# Patient Record
Sex: FEMALE | Race: BLACK OR AFRICAN AMERICAN | NOT HISPANIC OR LATINO | Employment: OTHER | URBAN - METROPOLITAN AREA
[De-identification: names, ages, dates, MRNs, and addresses within clinical notes are randomized per-mention and may not be internally consistent; named-entity substitution may affect disease eponyms.]

---

## 2017-01-11 ENCOUNTER — LAB VISIT (OUTPATIENT)
Dept: LAB | Facility: HOSPITAL | Age: 82
End: 2017-01-11
Attending: FAMILY MEDICINE
Payer: MEDICARE

## 2017-01-11 DIAGNOSIS — E03.4 HYPOTHYROIDISM DUE TO ACQUIRED ATROPHY OF THYROID: ICD-10-CM

## 2017-01-11 LAB — TSH SERPL DL<=0.005 MIU/L-ACNC: 0.52 UIU/ML

## 2017-01-11 PROCEDURE — 84443 ASSAY THYROID STIM HORMONE: CPT

## 2017-01-11 PROCEDURE — 36415 COLL VENOUS BLD VENIPUNCTURE: CPT | Mod: PO

## 2017-04-12 ENCOUNTER — OFFICE VISIT (OUTPATIENT)
Dept: URGENT CARE | Facility: CLINIC | Age: 82
End: 2017-04-12
Payer: MEDICARE

## 2017-04-12 ENCOUNTER — PATIENT MESSAGE (OUTPATIENT)
Dept: INTERNAL MEDICINE | Facility: CLINIC | Age: 82
End: 2017-04-12

## 2017-04-12 ENCOUNTER — TELEPHONE (OUTPATIENT)
Dept: PODIATRY | Facility: CLINIC | Age: 82
End: 2017-04-12

## 2017-04-12 ENCOUNTER — OFFICE VISIT (OUTPATIENT)
Dept: PODIATRY | Facility: CLINIC | Age: 82
End: 2017-04-12
Payer: MEDICARE

## 2017-04-12 VITALS
DIASTOLIC BLOOD PRESSURE: 82 MMHG | BODY MASS INDEX: 22.32 KG/M2 | HEIGHT: 64 IN | TEMPERATURE: 99 F | HEART RATE: 96 BPM | WEIGHT: 130.75 LBS | OXYGEN SATURATION: 97 % | SYSTOLIC BLOOD PRESSURE: 123 MMHG

## 2017-04-12 VITALS
WEIGHT: 131.63 LBS | BODY MASS INDEX: 22.47 KG/M2 | HEART RATE: 43 BPM | HEIGHT: 64 IN | SYSTOLIC BLOOD PRESSURE: 142 MMHG | DIASTOLIC BLOOD PRESSURE: 71 MMHG

## 2017-04-12 DIAGNOSIS — R00.1 BRADYCARDIA: Primary | ICD-10-CM

## 2017-04-12 DIAGNOSIS — I44.0 AV BLOCK, 1ST DEGREE: ICD-10-CM

## 2017-04-12 DIAGNOSIS — B35.1 ONYCHOMYCOSIS DUE TO DERMATOPHYTE: Primary | ICD-10-CM

## 2017-04-12 DIAGNOSIS — I73.9 PERIPHERAL VASCULAR DISEASE: ICD-10-CM

## 2017-04-12 PROCEDURE — 99213 OFFICE O/P EST LOW 20 MIN: CPT | Mod: PBBFAC,25,27,PO | Performed by: PODIATRIST

## 2017-04-12 PROCEDURE — 99213 OFFICE O/P EST LOW 20 MIN: CPT | Mod: 25,S$PBB,, | Performed by: PODIATRIST

## 2017-04-12 PROCEDURE — 99213 OFFICE O/P EST LOW 20 MIN: CPT | Mod: PBBFAC,PO | Performed by: NURSE PRACTITIONER

## 2017-04-12 PROCEDURE — 99999 PR PBB SHADOW E&M-EST. PATIENT-LVL III: CPT | Mod: PBBFAC,,, | Performed by: PODIATRIST

## 2017-04-12 PROCEDURE — 99999 PR PBB SHADOW E&M-EST. PATIENT-LVL III: CPT | Mod: PBBFAC,,, | Performed by: NURSE PRACTITIONER

## 2017-04-12 PROCEDURE — 93010 ELECTROCARDIOGRAM REPORT: CPT | Mod: ,,, | Performed by: INTERNAL MEDICINE

## 2017-04-12 PROCEDURE — 99214 OFFICE O/P EST MOD 30 MIN: CPT | Mod: S$PBB,,, | Performed by: NURSE PRACTITIONER

## 2017-04-12 PROCEDURE — 11721 DEBRIDE NAIL 6 OR MORE: CPT | Mod: Q8,S$PBB,, | Performed by: PODIATRIST

## 2017-04-12 PROCEDURE — 93005 ELECTROCARDIOGRAM TRACING: CPT | Mod: PBBFAC,PO | Performed by: NURSE PRACTITIONER

## 2017-04-12 PROCEDURE — 11721 DEBRIDE NAIL 6 OR MORE: CPT | Mod: Q8,PBBFAC,PO | Performed by: PODIATRIST

## 2017-04-12 NOTE — TELEPHONE ENCOUNTER
Returned 's call regarding her wanting to know what she should do about her pulse from today's visit. I informed  that she should contact her PCP () and inform him of her pulse.  stated understanding and the call ended well.

## 2017-04-12 NOTE — TELEPHONE ENCOUNTER
----- Message from Kate Severino sent at 4/12/2017  3:23 PM CDT -----  States would like to speak to nurse regarding medication. Please adv/call [phone].//thanks. cw

## 2017-04-12 NOTE — MR AVS SNAPSHOT
[facility]  [address] [facility] [address] [place] [address]  Phone: [phone]  Fax: [phone]                  Becki Luna   2017 5:50 PM   Office Visit    Description:  Female : 10/23/1921   Provider:  Tierney Hernandez NP   Department:  [facility]           Reason for Visit     Follow-up     pulse check           Diagnoses this Visit        Comments    Bradycardia    -  Primary Follow up with Cardiology for abnormal EKG.    AV block, 1st degree                To Do List           Future Appointments        Provider Department Dept Phone    2017 3:20 PM Manohar Culp MD [facility] - Cardiology [phone]    2017 8:00 AM Jun Farfan MD [facility] Internal Medicine [phone]    2017 10:20 AM Cas De La Fuente DPM [facility] Podiatry [phone]    2017 1:30 PM Arturo Esquivel MD [facility] Ophthalmology [phone]      Goals (5 Years of Data)     None      Follow-Up and Disposition     Return if symptoms worsen or fail to improve.      [facility]     [facility] On Call Nurse Care Line -  Assistance  Unless otherwise directed by your provider, please contact [facility], our nurse care line that is available for  assistance.     Registered nurses in the [facility] provide: appointment scheduling, clinical advisement, health education, and other advisory services.  Call: [phone] (toll free)               Medications           Message regarding Medications     Verify the changes and/or additions to your medication regime listed below are the same as discussed with your clinician today.  If any of these changes or additions are incorrect, please notify your healthcare provider.             Verify that the below list of medications is an accurate representation of the medications you are currently taking.  If none reported, the list may be blank. If incorrect, please contact your healthcare provider. Carry this list with you in case of  "emergency.           Current Medications     albuterol (PROVENTIL HFA/VENTOLIN HFA) 200 puff inhaler Inhale 2 puffs into the lungs 4 times daily as needed. Prn cough/wheeze    amlodipine (NORVASC) 5 MG tablet Take 1 tablet (5 mg total) by mouth once daily.    ASPIRIN ORAL Take by mouth.    fluvastatin XL (LESCOL XL) 80 mg Tb24 Take 1 tablet (80 mg total) by mouth once daily.    levothyroxine (SYNTHROID) 137 MCG Tab tablet Take 1 tablet (137 mcg total) by mouth once daily.    losartan-hydrochlorothiazide 100-25 mg (HYZAAR) 100-25 mg per tablet take 1 tablet by mouth once daily    PROPYLENE GLYCOL/ (SYSTANE ULTRA OPHT) Apply 1 drop to eye daily as needed.    TETRAHYDROZOLINE HCL (VISINE OPHT) Apply to eye as needed.    tobramycin (TOBREX) 0.3 % ophthalmic solution Place 1 drop into the right eye 4 (four) times daily.    colchicine (COLCRYS) 0.6 mg tablet Take 1 tablet (0.6 mg total) by mouth once daily. One pill daily    gabapentin (NEURONTIN) 100 MG capsule Take 1 capsule (100 mg total) by mouth 3 (three) times daily.           Clinical Reference Information           Your Vitals Were     BP Pulse Temp Height    123/82 (BP Location: Right arm, Patient Position: Sitting, BP Method: Manual) 96 98.6 °F (37 °C) (Tympanic) 5' 4" (1.626 m)    Weight SpO2 BMI    59.3 kg (130 lb 11.7 oz) 97% 22.44 kg/m2      Blood Pressure          Most Recent Value    BP  123/82      Allergies as of 4/12/2017     Celecoxib    Naproxen      Immunizations Administered on Date of Encounter - 4/12/2017     None      Orders Placed During Today's Visit      Normal Orders This Visit    Ambulatory referral to Cardiology     IN OFFICE EKG 12-LEAD (to [place])       Instructions      Understanding First-Degree Heart Block    Heart block is a condition in which the electrical system of the heart does not work properly. Sometimes it can result in a slow heartbeat that is either regular or irregular. This may cause symptoms. There are different " types of heart block that can be more or less serious. First-degree heart block is a condition in which the wiring of the heart is slow to send electrical signals but all of the signals are able to pass successfully. There is no electrical block but rather a slowing or delay of the signal. It usually does not cause problems. Often it does not need treatment.  What causes first-degree heart block?  First-degree heart block may be caused by:  · Natural aging process  · Damage to the heart from surgery  · Damage to the heart muscle from a heart attack  · Other types of heart disease that damage the heart muscle  · Low thyroid levels  · Electrolyte abnormalities  · Inflammatory or infectious heart conditions  · Other diseases, including rheumatic fever and sarcoidosis  · Some medicines  In addition, well-conditioned athletes may develop first-degree heart block from heart changes that result from exercising a lot. This is considered normal. Some babies are born with heart block. Heart block may also run in families.  What are the symptoms of first-degree heart block?  First-degree heart block often does not have any symptoms. It may be found when your healthcare provider is examining you for some other reason.  In more severe cases, people may have an uncomfortable awareness of the heartbeat.   How is first-degree heart block treated?  First-degree heart block usually doesnt need treatment. Your healthcare provider may ask you to have regular follow-up visits. You may also be asked to take your own pulse and be alert to changes in your heart rate.  What are the  complications of first-degree heart block?  In rare instances, a first-degree heart block may develop into a more serious type of heart block that results in slower heartbeats. This may cause symptoms.  When should I call my healthcare provider?  Call your healthcare provider right away if you have any of these:  · Unusual tiredness  · Shortness of  breath  · Chest pain  · Weakness, dizziness, or fainting  · Unusual drowsiness or confusion  · Pain that gets worse  · Symptoms that dont get better with treatment, or symptoms that get worse  · New symptoms   Date Last Reviewed: 5/1/2016  © 4219-2361 [employer], [place]. [address] [place] [address]. All rights reserved. This information is not intended as a substitute for professional medical care. Always follow your healthcare professional's instructions.             Language Assistance Services     ATTENTION: Language assistance services are available, free of charge. Please call [phone].      ATENCIÓN: Si habla brinda, tiene a reilly disposición servicios gratuitos de asistencia lingüística. Llame al [phone].     CHÚ Ý: N?u b?n nói Ti?ng Vi?t, có các d?ch v? h? tr? ngôn ng? mi?n phí dành cho b?n. G?i s? [phone].         [facility] complies with applicable Federal civil rights laws and does not discriminate on the basis of race, color, national origin, age, disability, or sex.

## 2017-04-12 NOTE — MR AVS SNAPSHOT
[facility]  [address] [facility] [place] [address]  Phone: [phone]  Fax: [phone]                  Becki Luna   2017 10:20 AM   Office Visit    Description:  Female : 10/23/1921   Provider:  Cas De La Fuente DPM   Department:  [facility] Pod[facility]           Reason for Visit     Routine Foot Care     Ankle Swelling                To Do List           Future Appointments        Provider Department Dept Phone    2017 10:20 AM Cas De La Fuente DPM [facility] [phone]    2017 1:30 PM Arturo Esquivel MD [facility] Ophthalmology [phone]      Goals (5 Years of Data)     None      [facility]     [facility] Care [facility] -  Assistance  Unless otherwise directed by your provider, please contact [facility], our nurse care line that is available for  assistance.     Registered nurses in the [facility] provide: appointment scheduling, clinical advisement, health education, and other advisory services.  Call: [phone] (toll free)               Medications           Message regarding Medications     Verify the changes and/or additions to your medication regime listed below are the same as discussed with your clinician today.  If any of these changes or additions are incorrect, please notify your healthcare provider.             Verify that the below list of medications is an accurate representation of the medications you are currently taking.  If none reported, the list may be blank. If incorrect, please contact your healthcare provider. Carry this list with you in case of emergency.           Current Medications     albuterol (PROVENTIL HFA/VENTOLIN HFA) 200 puff inhaler Inhale 2 puffs into the lungs 4 times daily as needed. Prn cough/wheeze    amlodipine (NORVASC) 5 MG tablet Take 1 tablet (5 mg total) by mouth once daily.    ASPIRIN ORAL Take by mouth.    fluvastatin XL (LESCOL XL) 80 mg Tb24 Take 1 tablet (80 mg total) by  "mouth once daily.    levothyroxine (SYNTHROID) 137 MCG Tab tablet Take 1 tablet (137 mcg total) by mouth once daily.    losartan-hydrochlorothiazide 100-25 mg (HYZAAR) 100-25 mg per tablet take 1 tablet by mouth once daily    PROPYLENE GLYCOL/ (SYSTANE ULTRA OPHT) Apply 1 drop to eye daily as needed.    TETRAHYDROZOLINE HCL (VISINE OPHT) Apply to eye as needed.    tobramycin (TOBREX) 0.3 % ophthalmic solution Place 1 drop into the right eye 4 (four) times daily.    colchicine (COLCRYS) 0.6 mg tablet Take 1 tablet (0.6 mg total) by mouth once daily. One pill daily    gabapentin (NEURONTIN) 100 MG capsule Take 1 capsule (100 mg total) by mouth 3 (three) times daily.           Clinical Reference Information           Your Vitals Were     BP Pulse Height Weight BMI    142/71 (BP Location: Right arm, Patient Position: Sitting, BP Method: Automatic) 43 5' 4" (1.626 m) 59.7 kg (131 lb 9.8 oz) 22.59 kg/m2      Blood Pressure          Most Recent Value    BP  (!)  142/71      Allergies as of 4/12/2017     Celecoxib    Naproxen      Immunizations Administered on Date of Encounter - 4/12/2017     None      Language Assistance Services     ATTENTION: Language assistance services are available, free of charge. Please call [phone].      ATENCIÓN: Si brigittela brinda, tiene a reilly disposición servicios gratuitos de asistencia lingüística. Llame al [phone].     CHÚ Ý: N?u b?n nói Ti?ng Vi?t, có các d?ch v? h? tr? ngôn ng? mi?n phí dành cho b?n. G?i s? [phone].         [facility] complies with applicable Federal civil rights laws and does not discriminate on the basis of race, color, national origin, age, disability, or sex.        "

## 2017-04-13 NOTE — PATIENT INSTRUCTIONS
Understanding First-Degree Heart Block    Heart block is a condition in which the electrical system of the heart does not work properly. Sometimes it can result in a slow heartbeat that is either regular or irregular. This may cause symptoms. There are different types of heart block that can be more or less serious. First-degree heart block is a condition in which the wiring of the heart is slow to send electrical signals but all of the signals are able to pass successfully. There is no electrical block but rather a slowing or delay of the signal. It usually does not cause problems. Often it does not need treatment.  What causes first-degree heart block?  First-degree heart block may be caused by:  · Natural aging process  · Damage to the heart from surgery  · Damage to the heart muscle from a heart attack  · Other types of heart disease that damage the heart muscle  · Low thyroid levels  · Electrolyte abnormalities  · Inflammatory or infectious heart conditions  · Other diseases, including rheumatic fever and sarcoidosis  · Some medicines  In addition, well-conditioned athletes may develop first-degree heart block from heart changes that result from exercising a lot. This is considered normal. Some babies are born with heart block. Heart block may also run in families.  What are the symptoms of first-degree heart block?  First-degree heart block often does not have any symptoms. It may be found when your healthcare provider is examining you for some other reason.  In more severe cases, people may have an uncomfortable awareness of the heartbeat.   How is first-degree heart block treated?  First-degree heart block usually doesnt need treatment. Your healthcare provider may ask you to have regular follow-up visits. You may also be asked to take your own pulse and be alert to changes in your heart rate.  What are the  complications of first-degree heart block?  In rare instances, a first-degree heart block may  develop into a more serious type of heart block that results in slower heartbeats. This may cause symptoms.  When should I call my healthcare provider?  Call your healthcare provider right away if you have any of these:  · Unusual tiredness  · Shortness of breath  · Chest pain  · Weakness, dizziness, or fainting  · Unusual drowsiness or confusion  · Pain that gets worse  · Symptoms that dont get better with treatment, or symptoms that get worse  · New symptoms   Date Last Reviewed: 5/1/2016 © 2000-2016 [employer]. [address] [place] [address], [place] [address]. All rights reserved. This information is not intended as a substitute for professional medical care. Always follow your healthcare professional's instructions.

## 2017-04-13 NOTE — PROGRESS NOTES
Subjective:       Patient ID: Becki Luna is a 89 y.o. female.    Chief Complaint: Follow-up (pulse check pt saw foot doctor this morn they told pt to come) and pulse check (back for pulse check)    HPI Comments: 89 yo female presents to Urgent Care with report of low heart rate in office today with podiatrist. She states she received a phone call from nurse stating she needed to return to clinic do to low heart rate recorded. She denies associated symptoms at present.    Review of Systems   Constitutional: Negative for activity change and fatigue.   HENT: Negative for rhinorrhea, sinus pressure, sore throat and trouble swallowing.    Eyes: Negative for pain, discharge and visual disturbance.   Respiratory: Negative for cough, shortness of breath and wheezing.    Cardiovascular: Negative for chest pain, palpitations and leg swelling.   Gastrointestinal: Negative for abdominal pain, constipation, diarrhea and nausea.   Endocrine: Negative for cold intolerance.   Genitourinary: Negative for difficulty urinating, dysuria, flank pain, frequency and genital sores.   Musculoskeletal: Negative for arthralgias, back pain and gait problem.   Skin: Negative for rash and wound.   Allergic/Immunologic: Negative for environmental allergies and food allergies.   Neurological: Negative for dizziness, light-headedness, numbness and headaches.   Hematological: Negative for adenopathy.   Psychiatric/Behavioral: Negative for behavioral problems.   All other systems reviewed and are negative.      Objective:      Physical Exam   Constitutional: She is oriented to person, place, and time. She appears well-developed and well-nourished.   Cardiovascular: Normal rate, regular rhythm, normal heart sounds, intact distal pulses and normal pulses.   No extrasystoles are present. PMI is not displaced.  Exam reveals no gallop, no S3, no S4 and no distant heart sounds.    No murmur heard.   No systolic murmur is present   Pulmonary/Chest:  Effort normal and breath sounds normal.   Neurological: She is alert and oriented to person, place, and time.   Skin: Skin is warm and dry.   Psychiatric: She has a normal mood and affect.   Nursing note and vitals reviewed.      Assessment:       1. Bradycardia    2. AV block, 1st degree        Plan:         Becki was seen today for follow-up and pulse check.    Diagnoses and all orders for this visit:    Bradycardia  Comments:  Follow up with Cardiology for abnormal EKG.  Orders:  -     IN OFFICE EKG 12-LEAD (to [place])  -     Ambulatory referral to Cardiology    AV block, 1st degree  -     Ambulatory referral to Cardiology    EKG SR with 1st AV block recorded in office. Advised patient and daughter follow up appointment needed with Cardiology for evaluation of abnormal EKG. No previous recorded EKG available in records. Patient agreed with plan. Appointment given. Discharged in stable condition with AVS in hand.   When should I call my healthcare provider?  Call your healthcare provider right away or report to ER if you have any of these:  · Unusual tiredness  · Shortness of breath  · Chest pain  · Weakness, dizziness, or fainting  · Unusual drowsiness or confusion  · Pain that gets worse  · Symptoms that dont get better with treatment, or symptoms that get worse  · New symptoms

## 2017-04-21 NOTE — PROGRESS NOTES
Subjective:     Patient ID: Becki Luna is a 89 y.o. female.    Chief Complaint: Routine Foot Care (PCP: LILLI Farfan 11/14/2016, nail care/feet check, patient ) and Ankle Swelling (right ankle, patient c/o having pain and tightness at bedtime )    Becki is a 89 y.o. female who presents to the clinic for evaluation and treatment of high risk feet. Becki has a past medical history of Elevated cholesterol; Facial spasm; Hypertension; Hypothyroid; and Osteoporosis (5/15 junito 5/17). The patient's chief complaint is long, thick toenails. This patient has documented high risk feet requiring routine maintenance secondary to peripheral vascular disease.    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 Prn cough/wheeze    AMLODIPINE (NORVASC) 5 MG TABLET    Take 1 tablet (5 mg total) by mouth once daily.    ASPIRIN ORAL    Take by mouth.    COLCHICINE (COLCRYS) 0.6 MG TABLET    Take 1 tablet (0.6 mg total) by mouth once daily. One pill daily    FLUVASTATIN XL (LESCOL XL) 80 MG TB24    Take 1 tablet (80 mg total) by mouth once daily.    GABAPENTIN (NEURONTIN) 100 MG CAPSULE    Take 1 capsule (100 mg total) by mouth 3 (three) times daily.    LEVOTHYROXINE (SYNTHROID) 137  "MCG TAB TABLET    Take 1 tablet (137 mcg total) by mouth once daily.    LOSARTAN-HYDROCHLOROTHIAZIDE 100-25 MG (HYZAAR) 100-25 MG PER TABLET    take 1 tablet by mouth once daily    PROPYLENE GLYCOL/ (SYSTANE ULTRA OPHT)    Apply 1 drop to eye daily as needed.    TETRAHYDROZOLINE HCL (VISINE OPHT)    Apply to eye as needed.    TOBRAMYCIN (TOBREX) 0.3 % OPHTHALMIC SOLUTION    Place 1 drop into the right eye 4 (four) times daily.       Review of patient's allergies indicates:   Allergen Reactions    Celecoxib Nausea And Vomiting    Naproxen Rash       Past Surgical History:   Procedure Laterality Date    CATARACT EXTRACTION         Family History   Problem Relation Age of Onset    Diabetes Maternal Grandmother     Hypertension Sister        Social History     Social History    Marital status:      Spouse name: N/A    Number of children: 7    Years of education: N/A     Occupational History    Not on file.     Social History Main Topics    Smoking status: Never Smoker    Smokeless tobacco: Never Used    Alcohol use No    Drug use: No    Sexual activity: Not on file     Other Topics Concern    Not on file     Social History Narrative       Vitals:    04/12/17 1011   BP: (!) 142/71   Pulse: (!) 43   Weight: 59.7 kg (131 lb 9.8 oz)   Height: 5' 4" (1.626 m)   PainSc: 0-No pain       Review of Systems   Constitutional: Negative for chills and fever.   Respiratory: Negative for shortness of breath.    Cardiovascular: Positive for leg swelling. Negative for chest pain, palpitations, orthopnea and claudication.   Gastrointestinal: Negative for diarrhea, nausea and vomiting.   Musculoskeletal: Negative for joint pain.   Skin: Negative for rash.   Neurological: Negative for dizziness, tingling, sensory change, focal weakness and weakness.   Endo/Heme/Allergies: Bruises/bleeds easily.   Psychiatric/Behavioral: Negative.          Objective:       Objective:   PHYSICAL EXAM: Apperance: Alert and orient " in no distress,well developed, and with good attention to grooming and body habits  Lower Extremity Exam:  VASCULAR: Dorsalis pedis pulses 0/4 bilateral and Posterior Tibial pulses 1/4 bilateral. Capillary fill time <4 seconds bilateral. Mild edema observed bilateral. Varicosities absent bilateral. Skin temperature of the lower extremities is warm to cool, proximal to distal. Hair growth absent bilateral.  DERMATOLOGICAL: No skin rashes, subcutaneous nodules, lesions, or ulcers observed bilateral. Nails 1-5 bilateral elongated, thickened, and discolored with subungual debris. Webspaces 1-4 debris present bilateral.   NEUROLOGICAL: Light touch, sharp-dull, proprioception all present and equal bilaterally.    MUSCULOSKELETAL: Muscle strength is 5/5 for foot inverters, everters, plantarflexors, and dorsiflexors. Muscle tone is normal.     Assessment:       Encounter Diagnoses   Name Primary?    Onychomycosis due to dermatophyte Yes    Peripheral vascular disease          Plan:   Onychomycosis due to dermatophyte    Peripheral vascular disease    I counseled the patient on her conditions, regarding findings of my examination, my impressions, and usual treatment plan.   Greater than 50% of this visit spent on counseling and coordination of care.  Greater than 20 minutes spent on education about the diabetic foot, neuropathy, and prevention of limb loss.  Shoe inspection. Diabetic Foot Education. Patient reminded of the importance of good nutrition and blood sugar control to help prevent podiatric complications of diabetes. Patient instructed on proper foot hygeine. We discussed wearing proper shoe gear, daily foot inspections, never walking without protective shoe gear, never putting sharp instruments to feet.    With patient's permission, nails 1-5 bilateral were trimmed in length and thickness aggressively to their soft tissue attachment mechanically and with electric , removing all offending nail and debris.  Patient relates relief following the procedure.  Patient  will continue to monitor the areas daily, inspect feet, wear protective shoe gear when ambulatory, moisturizer to maintain skin integrity. Patient reminded of the importance of good nutrition and blood sugar control to help prevent podiatric complications of diabetes.  Patient to return 5 months or sooner if needed.             Cas De La Fuente DPM  [facility]

## 2017-04-25 ENCOUNTER — OFFICE VISIT (OUTPATIENT)
Dept: CARDIOLOGY | Facility: CLINIC | Age: 82
End: 2017-04-25
Payer: MEDICARE

## 2017-04-25 VITALS
HEART RATE: 76 BPM | BODY MASS INDEX: 22.85 KG/M2 | DIASTOLIC BLOOD PRESSURE: 58 MMHG | WEIGHT: 133.81 LBS | HEIGHT: 64 IN | SYSTOLIC BLOOD PRESSURE: 122 MMHG

## 2017-04-25 DIAGNOSIS — E78.00 ELEVATED CHOLESTEROL: ICD-10-CM

## 2017-04-25 DIAGNOSIS — I10 ESSENTIAL HYPERTENSION: ICD-10-CM

## 2017-04-25 DIAGNOSIS — R00.1 BRADYCARDIA: Primary | ICD-10-CM

## 2017-04-25 DIAGNOSIS — R01.1 HEART MURMUR: ICD-10-CM

## 2017-04-25 PROCEDURE — 99213 OFFICE O/P EST LOW 20 MIN: CPT | Mod: PBBFAC,PO | Performed by: INTERNAL MEDICINE

## 2017-04-25 PROCEDURE — 99204 OFFICE O/P NEW MOD 45 MIN: CPT | Mod: S$PBB,,, | Performed by: INTERNAL MEDICINE

## 2017-04-25 PROCEDURE — 99999 PR PBB SHADOW E&M-EST. PATIENT-LVL III: CPT | Mod: PBBFAC,,, | Performed by: INTERNAL MEDICINE

## 2017-04-25 NOTE — MR AVS SNAPSHOT
[facility] - Cardiology  [address] [facility] [place] [address]  Phone: [phone]  Fax: [phone]                  Becki Luna   2017 3:20 PM   Office Visit    Description:  Female : 10/23/1921   Provider:  Manohar Culp MD   Department:  [facility] - Cardiology           Reason for Visit     Abnormal ECG     Edema           Diagnoses this Visit        Comments    Bradycardia    -  Primary     Heart murmur         Elevated cholesterol         Essential hypertension                To Do List           Future Appointments        Provider Department Dept Phone    2017 3:20 PM Manohar Culp MD [facility] Cardiology [phone]    2017 8:00 AM Jun Farfan MD [facility] Internal Medicine [phone]    2017 10:20 AM Cas De La Fuente DPM [facility] - Podiatry [phone]    2017 1:30 PM Arturo Esquivel MD [facility] Ophthalmology [phone]      Goals (5 Years of Data)     None      Follow-Up and Disposition     Return if symptoms worsen or fail to improve.      [facility] On Call     [facility] On Call Nurse Care Line -  Assistance  Unless otherwise directed by your provider, please contact [facility], our nurse care line that is available for  assistance.     Registered nurses in the [facility] provide: appointment scheduling, clinical advisement, health education, and other advisory services.  Call: [phone] (toll free)               Medications           Message regarding Medications     Verify the changes and/or additions to your medication regime listed below are the same as discussed with your clinician today.  If any of these changes or additions are incorrect, please notify your healthcare provider.        STOP taking these medications     fluvastatin XL (LESCOL XL) 80 mg Tb24 Take 1 tablet (80 mg total) by mouth once daily.           Verify that the below list of medications is an accurate representation of the medications you are currently taking.  If  "none reported, the list may be blank. If incorrect, please contact your healthcare provider. Carry this list with you in case of emergency.           Current Medications     albuterol (PROVENTIL HFA/VENTOLIN HFA) 200 puff inhaler Inhale 2 puffs into the lungs 4 times daily as needed. Prn cough/wheeze    amlodipine (NORVASC) 5 MG tablet Take 1 tablet (5 mg total) by mouth once daily.    ASPIRIN ORAL Take by mouth.    colchicine (COLCRYS) 0.6 mg tablet Take 1 tablet (0.6 mg total) by mouth once daily. One pill daily    levothyroxine (SYNTHROID) 137 MCG Tab tablet Take 1 tablet (137 mcg total) by mouth once daily.    losartan-hydrochlorothiazide 100-25 mg (HYZAAR) 100-25 mg per tablet take 1 tablet by mouth once daily    PROPYLENE GLYCOL/ (SYSTANE ULTRA OPHT) Apply 1 drop to eye daily as needed.    TETRAHYDROZOLINE HCL (VISINE OPHT) Apply to eye as needed.    tobramycin (TOBREX) 0.3 % ophthalmic solution Place 1 drop into the right eye 4 (four) times daily.    gabapentin (NEURONTIN) 100 MG capsule Take 1 capsule (100 mg total) by mouth 3 (three) times daily.           Clinical Reference Information           Your Vitals Were     BP Pulse Height Weight BMI    122/58 (BP Location: Right arm) 76 5' 4" (1.626 m) 60.7 kg (133 lb 13.1 oz) 22.97 kg/m2      Blood Pressure          Most Recent Value    BP  (!)  122/58      Allergies as of 4/25/2017     Celecoxib    Naproxen      Immunizations Administered on Date of Encounter - 4/25/2017     None      Orders Placed During Today's Visit      Normal Orders This Visit    Holter monitor - 48 hour     Future Labs/Procedures Expected by Expires    2D Echo w/ Color Flow Doppler  As directed 4/25/2018      Language Assistance Services     ATTENTION: Language assistance services are available, free of charge. Please call [phone].      ATENCIÓN: Si brigittela brinda, tiene a reilly disposición servicios gratuitos de asistencia lingüística. Llame al [phone].     CHÚ Ý: N?u " b?n nói Ti?ng Vi?t, có các d?ch v? h? tr? ngôn ng? mi?n phí dành cho b?n. G?i s? [phone].         [facility] - Cardiology complies with applicable Federal civil rights laws and does not discriminate on the basis of race, color, national origin, age, disability, or sex.

## 2017-04-25 NOTE — PROGRESS NOTES
Subjective:   Patient ID:  Becki Luna is a 89 y.o. female who presents for evaluation of Abnormal ECG and Edema      HPI Comments: 89 yo female, came in for bradycardia.  PMH HTN and HLD, walker dependent due to OA.  She went to podiatry clinic on 04/12 and the pulse wa 46 bpm. Later the day, at urgent care, EKG showed NSR NT 78 bpm, 1sy AVB, RBBB and PVC. ekg in 2010 showed NSR.  She denied chest pain, dyspnea, palpitation, dizziness, fainting and orthopnea.   Decent activity.      Past Medical History:   Diagnosis Date    Elevated cholesterol     Facial spasm     foll by opth    Hypertension     Hypothyroid     Osteoporosis 5/15 junito 5/17       Past Surgical History:   Procedure Laterality Date    CATARACT EXTRACTION         Social History   Substance Use Topics    Smoking status: Never Smoker    Smokeless tobacco: Never Used    Alcohol use No       Family History   Problem Relation Age of Onset    Diabetes Maternal Grandmother     Hypertension Sister        Review of Systems   Constitution: Negative for decreased appetite, malaise/fatigue and night sweats.   HENT: Negative for nosebleeds.    Eyes: Negative for blurred vision and double vision.   Cardiovascular: Negative for claudication, dyspnea on exertion, irregular heartbeat, leg swelling, near-syncope, orthopnea, palpitations, paroxysmal nocturnal dyspnea and syncope.   Respiratory: Negative for shortness of breath, sleep disturbances due to breathing, snoring, sputum production and wheezing.    Endocrine: Negative for cold intolerance and polyuria.   Hematologic/Lymphatic: Does not bruise/bleed easily.   Musculoskeletal: Positive for back pain. Negative for falls and joint pain.   Gastrointestinal: Negative for heartburn.   Genitourinary: Negative for dysuria, frequency and hematuria.   Neurological: Negative for difficulty with concentration, dizziness, focal weakness, light-headedness and seizures.   Psychiatric/Behavioral: Negative for  depression, memory loss and substance abuse. The patient does not have insomnia.    Allergic/Immunologic: Negative for HIV exposure and hives.       Objective:   Physical Exam   Constitutional: She is oriented to person, place, and time. She appears well-nourished.   HENT:   Head: Normocephalic.   Eyes: Pupils are equal, round, and reactive to light.   Neck: Normal carotid pulses and no JVD present. Carotid bruit is not present. No thyromegaly present.   Cardiovascular: Normal rate, regular rhythm and normal pulses.  PMI is not displaced.  Exam reveals no gallop and no S3.    Murmur heard.  Pulses:       Radial pulses are 2+ on the right side, and 2+ on the left side.   ESM on RUSB and midLSB   Pulmonary/Chest: Breath sounds normal. No stridor. No respiratory distress.   Abdominal: Soft. Bowel sounds are normal. There is no tenderness. There is no rebound.   Musculoskeletal: Normal range of motion. She exhibits edema.   1+ BLE   Neurological: She is alert and oriented to person, place, and time.   Skin: Skin is intact. No rash noted.   Psychiatric: Her behavior is normal.       Lab Results   Component Value Date    CHOL 208 (H) 10/19/2016    CHOL 150 08/26/2015    CHOL 131 08/25/2014     Lab Results   Component Value Date    HDL 56 10/19/2016    HDL 51 08/26/2015    HDL 44 08/25/2014     Lab Results   Component Value Date    LDLCALC 117.4 10/19/2016    LDLCALC 84.0 08/26/2015    LDLCALC 72.8 08/25/2014     Lab Results   Component Value Date    TRIG 173 (H) 10/19/2016    TRIG 75 08/26/2015    TRIG 71 08/25/2014     Lab Results   Component Value Date    CHOLHDL 26.9 10/19/2016    CHOLHDL 34.0 08/26/2015    CHOLHDL 33.6 08/25/2014       Chemistry        Component Value Date/Time     10/19/2016 0947    K 4.2 10/19/2016 0947     10/19/2016 0947    CO2 22 (L) 10/19/2016 0947    BUN 23 10/19/2016 0947    CREATININE 1.3 10/19/2016 0947     (H) 10/19/2016 0947        Component Value Date/Time    CALCIUM  9.6 10/19/2016 0947    ALKPHOS 98 01/25/2016 1008    AST 22 01/25/2016 1008    ALT 12 01/25/2016 1008    BILITOT 0.3 01/25/2016 1008          Lab Results   Component Value Date    TSH 0.518 01/11/2017     No results found for: INR, PROTIME  Lab Results   Component Value Date    WBC 4.42 02/06/2014    HGB 10.5 (L) 02/06/2014    HCT 33.2 (L) 02/06/2014    MCV 93 02/06/2014     02/06/2014     BNP  @LABRCNTIP(BNP,BNPTRIAGEBLO)@  CrCl cannot be calculated (Patient has no serum creatinine result on file.).     Assessment:      1. Bradycardia    2. Heart murmur    3. Elevated cholesterol    4. Essential hypertension      One time low pulse 43 bpm at Podiatry clinic. EKG nSR and PVC, asymptomatic    Plan:   Echo for murmur and hOlter to r/o shanthi,  Will hiwot for the results.  RTC as indicated

## 2017-04-25 NOTE — LETTER
April 25, 2017      Tierney Hernandez NP  [address] [facility] [place] [address]           [facility] - Cardiology  [address] [facility] [place] [address]  Phone: [phone]  Fax: [phone]          Patient: Becki Luna   MR Number: [id number]   YOB: 1921   Date of Visit: 4/25/2017       Dear Tierney Hernandez:    Thank you for referring Becki Luna to me for evaluation. Attached you will find relevant portions of my assessment and plan of care.    If you have questions, please do not hesitate to call me. I look forward to following Becki Luna along with you.    Sincerely,    Manohar Culp MD    Enclosure  CC:  No Recipients    If you would like to receive this communication electronically, please contact [email or web address] or [phone] to request more information on [employer] access.    For providers and/or their staff who would like to refer a patient to [facility], please contact us through our one-stop-shop provider referral line, Rosanna Wolfe, at [phone].    If you feel you have received this communication in error or would no longer like to receive these types of communications, please e-mail [email or web address]

## 2017-04-27 ENCOUNTER — CLINICAL SUPPORT (OUTPATIENT)
Dept: CARDIOLOGY | Facility: CLINIC | Age: 82
End: 2017-04-27
Payer: MEDICARE

## 2017-04-27 DIAGNOSIS — R00.1 BRADYCARDIA: ICD-10-CM

## 2017-04-27 DIAGNOSIS — R01.1 HEART MURMUR: ICD-10-CM

## 2017-04-27 LAB
AORTIC VALVE STENOSIS: ABNORMAL
ESTIMATED PA SYSTOLIC PRESSURE: 36.94
MITRAL VALVE REGURGITATION: ABNORMAL
RETIRED EF AND QEF - SEE NOTES: 60 (ref 55–65)
TRICUSPID VALVE REGURGITATION: ABNORMAL

## 2017-04-27 PROCEDURE — 93306 TTE W/DOPPLER COMPLETE: CPT | Mod: 26,S$PBB,, | Performed by: INTERNAL MEDICINE

## 2017-05-01 ENCOUNTER — TELEPHONE (OUTPATIENT)
Dept: CARDIOLOGY | Facility: CLINIC | Age: 82
End: 2017-05-01

## 2017-05-01 NOTE — TELEPHONE ENCOUNTER
----- Message from Brandon Rosen sent at 4/28/2017  4:25 PM CDT -----  Contact: Jesus/Lisa Gonzalez called in regarding the attached patient (mom-Becki).  Lisa stated she was returning a call to Olesya.  Olesya was calling Lisa about test results.  Lisa' call back number [phone]

## 2017-05-03 ENCOUNTER — TELEPHONE (OUTPATIENT)
Dept: CARDIOLOGY | Facility: CLINIC | Age: 82
End: 2017-05-03

## 2017-05-03 DIAGNOSIS — I49.3 FREQUENT PVCS: ICD-10-CM

## 2017-05-03 RX ORDER — METOPROLOL SUCCINATE 25 MG/1
25 TABLET, EXTENDED RELEASE ORAL DAILY
Qty: 30 TABLET | Refills: 11 | Status: SHIPPED | OUTPATIENT
Start: 2017-05-03 | End: 2018-05-09 | Stop reason: SDUPTHER

## 2017-05-03 NOTE — TELEPHONE ENCOUNTER
Please call pt that d/c amlodipine.  Add ToprolXL 25 mg daily. Sent ti [facility].  Please schedule RTC in 4 weeks.   THX

## 2017-05-22 RX ORDER — LEVOTHYROXINE SODIUM 137 UG/1
TABLET ORAL
Qty: 30 TABLET | Refills: 5 | Status: SHIPPED | OUTPATIENT
Start: 2017-05-22 | End: 2017-12-24 | Stop reason: SDUPTHER

## 2017-06-01 ENCOUNTER — OFFICE VISIT (OUTPATIENT)
Dept: CARDIOLOGY | Facility: CLINIC | Age: 82
End: 2017-06-01
Payer: MEDICARE

## 2017-06-01 VITALS
HEIGHT: 64 IN | DIASTOLIC BLOOD PRESSURE: 64 MMHG | HEART RATE: 66 BPM | WEIGHT: 129.44 LBS | BODY MASS INDEX: 22.1 KG/M2 | OXYGEN SATURATION: 98 % | SYSTOLIC BLOOD PRESSURE: 144 MMHG

## 2017-06-01 DIAGNOSIS — I10 ESSENTIAL HYPERTENSION: Primary | ICD-10-CM

## 2017-06-01 DIAGNOSIS — I49.3 FREQUENT PVCS: ICD-10-CM

## 2017-06-01 PROCEDURE — 99213 OFFICE O/P EST LOW 20 MIN: CPT | Mod: PBBFAC,PO | Performed by: INTERNAL MEDICINE

## 2017-06-01 PROCEDURE — 99999 PR PBB SHADOW E&M-EST. PATIENT-LVL III: CPT | Mod: PBBFAC,,, | Performed by: INTERNAL MEDICINE

## 2017-06-01 PROCEDURE — 99214 OFFICE O/P EST MOD 30 MIN: CPT | Mod: S$PBB,,, | Performed by: INTERNAL MEDICINE

## 2017-06-01 NOTE — PROGRESS NOTES
Subjective:   Patient ID:  Becki Luna is a 89 y.o. female who presents for follow up of No chief complaint on file.      89 yo female, came in for bradycardia.  PMH HTN and HLD, walker dependent due to OA.  She went to podiatry clinic on 04/12 and the pulse wa 46 bpm. Later the day, at urgent care, EKG showed NSR NT 78 bpm, 1sy AVB, RBBB and PVC. ekg in 2010 showed NSR.  She denied chest pain, dyspnea, palpitation, dizziness, fainting and orthopnea.   Decent activity.  , ECHO normal EF, mild AS; Holter frequent PVCs. Pulse wnl        Past Medical History:   Diagnosis Date    Elevated cholesterol     Facial spasm     foll by opth    Hypertension     Hypothyroid     Osteoporosis 5/15 junito 5/17       Past Surgical History:   Procedure Laterality Date    CATARACT EXTRACTION         Social History   Substance Use Topics    Smoking status: Never Smoker    Smokeless tobacco: Never Used    Alcohol use No       Family History   Problem Relation Age of Onset    Diabetes Maternal Grandmother     Hypertension Sister          Review of Systems   Constitution: Negative for decreased appetite, malaise/fatigue and night sweats.   HENT: Negative for nosebleeds.    Eyes: Negative for blurred vision and double vision.   Cardiovascular: Negative for claudication, dyspnea on exertion, irregular heartbeat, leg swelling, near-syncope, orthopnea, palpitations, paroxysmal nocturnal dyspnea and syncope.   Respiratory: Negative for shortness of breath, sleep disturbances due to breathing, snoring, sputum production and wheezing.    Endocrine: Negative for cold intolerance and polyuria.   Hematologic/Lymphatic: Does not bruise/bleed easily.   Musculoskeletal: Positive for back pain. Negative for falls and joint pain.   Gastrointestinal: Negative for heartburn.   Genitourinary: Negative for dysuria, frequency and hematuria.   Neurological: Negative for difficulty with concentration, dizziness, focal weakness,  light-headedness and seizures.   Psychiatric/Behavioral: Negative for depression, memory loss and substance abuse. The patient does not have insomnia.    Allergic/Immunologic: Negative for HIV exposure and hives.       Objective:   Physical Exam   Constitutional: She is oriented to person, place, and time. She appears well-nourished.   HENT:   Head: Normocephalic.   Eyes: Pupils are equal, round, and reactive to light.   Neck: Normal carotid pulses and no JVD present. Carotid bruit is not present. No thyromegaly present.   Cardiovascular: Normal rate, regular rhythm and normal pulses.  PMI is not displaced.  Exam reveals no gallop and no S3.    Murmur heard.  Pulses:       Radial pulses are 2+ on the right side, and 2+ on the left side.   ESM on RUSB and midLSB   Pulmonary/Chest: Breath sounds normal. No stridor. No respiratory distress.   Abdominal: Soft. Bowel sounds are normal. There is no tenderness. There is no rebound.   Musculoskeletal: Normal range of motion. She exhibits edema.   1+ BLE   Neurological: She is alert and oriented to person, place, and time.   Skin: Skin is intact. No rash noted.   Psychiatric: Her behavior is normal.       Lab Results   Component Value Date    CHOL 208 (H) 10/19/2016    CHOL 150 08/26/2015    CHOL 131 08/25/2014     Lab Results   Component Value Date    HDL 56 10/19/2016    HDL 51 08/26/2015    HDL 44 08/25/2014     Lab Results   Component Value Date    LDLCALC 117.4 10/19/2016    LDLCALC 84.0 08/26/2015    LDLCALC 72.8 08/25/2014     Lab Results   Component Value Date    TRIG 173 (H) 10/19/2016    TRIG 75 08/26/2015    TRIG 71 08/25/2014     Lab Results   Component Value Date    CHOLHDL 26.9 10/19/2016    CHOLHDL 34.0 08/26/2015    CHOLHDL 33.6 08/25/2014       Chemistry        Component Value Date/Time     10/19/2016 0947    K 4.2 10/19/2016 0947     10/19/2016 0947    CO2 22 (L) 10/19/2016 0947    BUN 23 10/19/2016 0947    CREATININE 1.3 10/19/2016 0947    GLU  120 (H) 10/19/2016 0947        Component Value Date/Time    CALCIUM 9.6 10/19/2016 0947    ALKPHOS 98 01/25/2016 1008    AST 22 01/25/2016 1008    ALT 12 01/25/2016 1008    BILITOT 0.3 01/25/2016 1008          Lab Results   Component Value Date    TSH 0.518 01/11/2017     No results found for: INR, PROTIME  Lab Results   Component Value Date    WBC 4.42 02/06/2014    HGB 10.5 (L) 02/06/2014    HCT 33.2 (L) 02/06/2014    MCV 93 02/06/2014     02/06/2014     BMP  Sodium   Date Value Ref Range Status   10/19/2016 144 136 - 145 mmol/L Final     Potassium   Date Value Ref Range Status   10/19/2016 4.2 3.5 - 5.1 mmol/L Final     Chloride   Date Value Ref Range Status   10/19/2016 110 95 - 110 mmol/L Final     CO2   Date Value Ref Range Status   10/19/2016 22 (L) 23 - 29 mmol/L Final     BUN, Bld   Date Value Ref Range Status   10/19/2016 23 10 - 30 mg/dL Final     Creatinine   Date Value Ref Range Status   10/19/2016 1.3 0.5 - 1.4 mg/dL Final     Calcium   Date Value Ref Range Status   10/19/2016 9.6 8.7 - 10.5 mg/dL Final     Anion Gap   Date Value Ref Range Status   10/19/2016 12 8 - 16 mmol/L Final     eGFR if    Date Value Ref Range Status   10/19/2016 40.6 (A) >60 mL/min/1.73 m^2 Final     eGFR if non    Date Value Ref Range Status   10/19/2016 35.2 (A) >60 mL/min/1.73 m^2 Final     Comment:     Calculation used to obtain the estimated glomerular filtration  rate (eGFR) is the CKD-EPI equation. Since race is unknown   in our information system, the eGFR values for   -American and Non--American patients are given   for each creatinine result.       CrCl cannot be calculated (Patient's most recent sCr result is older than the maximum 14 days allowed.).     Assessment:      1. Essential hypertension    2. Frequent PVCs        Plan:   Continue Metoprolol and Hyzaar, Keep SBP < 150 mmHg  RTC in 4 months

## 2017-06-12 ENCOUNTER — OFFICE VISIT (OUTPATIENT)
Dept: INTERNAL MEDICINE | Facility: CLINIC | Age: 82
End: 2017-06-12
Payer: MEDICARE

## 2017-06-12 VITALS
BODY MASS INDEX: 22.1 KG/M2 | HEIGHT: 64 IN | HEART RATE: 97 BPM | WEIGHT: 129.44 LBS | TEMPERATURE: 98 F | SYSTOLIC BLOOD PRESSURE: 140 MMHG | OXYGEN SATURATION: 98 % | DIASTOLIC BLOOD PRESSURE: 68 MMHG

## 2017-06-12 DIAGNOSIS — I10 ESSENTIAL HYPERTENSION: Primary | ICD-10-CM

## 2017-06-12 DIAGNOSIS — M81.0 OSTEOPOROSIS, UNSPECIFIED OSTEOPOROSIS TYPE, UNSPECIFIED PATHOLOGICAL FRACTURE PRESENCE: ICD-10-CM

## 2017-06-12 DIAGNOSIS — E03.0 CONGENITAL HYPOTHYROIDISM WITH DIFFUSE GOITER: Chronic | ICD-10-CM

## 2017-06-12 PROCEDURE — 99213 OFFICE O/P EST LOW 20 MIN: CPT | Mod: PBBFAC,PO | Performed by: FAMILY MEDICINE

## 2017-06-12 PROCEDURE — 1126F AMNT PAIN NOTED NONE PRSNT: CPT | Mod: ,,, | Performed by: FAMILY MEDICINE

## 2017-06-12 PROCEDURE — 99999 PR PBB SHADOW E&M-EST. PATIENT-LVL III: CPT | Mod: PBBFAC,,, | Performed by: FAMILY MEDICINE

## 2017-06-12 PROCEDURE — 1159F MED LIST DOCD IN RCRD: CPT | Mod: ,,, | Performed by: FAMILY MEDICINE

## 2017-06-12 PROCEDURE — 99214 OFFICE O/P EST MOD 30 MIN: CPT | Mod: S$PBB,,, | Performed by: FAMILY MEDICINE

## 2017-06-12 RX ORDER — AMLODIPINE BESYLATE 5 MG/1
TABLET ORAL
Refills: 1 | COMMUNITY
Start: 2017-05-21 | End: 2017-06-12

## 2017-06-12 NOTE — PROGRESS NOTES
Subjective:       Patient ID: Becki Luna is a . female.    Chief Complaint: Multiple issues see below    HPI Hypertension: blood pressures at home normaloff norvasc . Tolerating medicine. Saw card recently  Hypothyroidism:  elev prior tsh. No missed doses;Tolerating med. asympt  bilat chronic knee pain and occas thigh pain. Prn  bid tyl.  osteppr die dexa    Past Medical History   Diagnosis Date    Hypertension     Elevated cholesterol     Hypothyroid     Facial spasm      foll by opth     Past Surgical History   Procedure Laterality Date    Cataract extraction       Family History   Problem Relation Age of Onset    Diabetes Maternal Grandmother     Hypertension Sister      \    Review of Systems  Cardiovascular: no chest pain  Chest: no shortness of breath  Abd: no abd pain  Remainder review of systems negative    Objective:    daught here  Physical Exam   Constitutional: She is oriented to person, place, and time. She appears well-developed and well-nourished.   HENT: bilat tmac clear  Head: Normocephalic and atraumatic.   Neck: Normal range of motion. Neck supple. Carotid bruit is not present.   Cardiovascular: Normal rate and regular rhythm.    Pulmonary/Chest: Effort normal and breath sounds normal.   .   Lymphadenopathy:     She has no cervical adenopathy.   Neurological: She is alert and oriented to person, place, and time.   Skin: Skin is warm and dry.   Psychiatric: She has a normal mood and affect. Her behavior is normal. Judgment normal.       Assessment:       1. Hypothyroidism    2. Hypertension    3. Elevated cholesterol    4. Osteoporosis      djd knees  bisphos intol   Plan:   Lab and follow up after in 6 months  *  F/u dr aguero. Rheum osteopor/OA  Essential hypertension  -     Basic metabolic panel; Future; Expected date: 12/09/2017  -     Lipid panel; Future; Expected date: 12/09/2017    Congenital hypothyroidism with diffuse goiter  -     TSH; Future; Expected date:  12/09/2017    Osteoporosis, unspecified osteoporosis type, unspecified pathological fracture presence  -     DXA Bone Density Spine And Hip; Future; Expected date: 06/12/2017

## 2017-07-25 RX ORDER — LOSARTAN POTASSIUM AND HYDROCHLOROTHIAZIDE 25; 100 MG/1; MG/1
TABLET ORAL
Qty: 30 TABLET | Refills: 11 | Status: SHIPPED | OUTPATIENT
Start: 2017-07-25 | End: 2018-09-26 | Stop reason: SDUPTHER

## 2017-09-06 ENCOUNTER — OFFICE VISIT (OUTPATIENT)
Dept: CARDIOLOGY | Facility: CLINIC | Age: 82
End: 2017-09-06
Payer: MEDICARE

## 2017-09-06 VITALS
BODY MASS INDEX: 22.5 KG/M2 | DIASTOLIC BLOOD PRESSURE: 60 MMHG | HEIGHT: 64 IN | WEIGHT: 131.81 LBS | HEART RATE: 60 BPM | SYSTOLIC BLOOD PRESSURE: 148 MMHG

## 2017-09-06 DIAGNOSIS — I49.3 FREQUENT PVCS: Primary | ICD-10-CM

## 2017-09-06 DIAGNOSIS — I10 ESSENTIAL HYPERTENSION: ICD-10-CM

## 2017-09-06 DIAGNOSIS — I35.0 NONRHEUMATIC AORTIC VALVE STENOSIS: ICD-10-CM

## 2017-09-06 PROCEDURE — 99214 OFFICE O/P EST MOD 30 MIN: CPT | Mod: S$PBB,,, | Performed by: INTERNAL MEDICINE

## 2017-09-06 PROCEDURE — 1159F MED LIST DOCD IN RCRD: CPT | Mod: ,,, | Performed by: INTERNAL MEDICINE

## 2017-09-06 PROCEDURE — 99213 OFFICE O/P EST LOW 20 MIN: CPT | Mod: PBBFAC,PO | Performed by: INTERNAL MEDICINE

## 2017-09-06 PROCEDURE — 99999 PR PBB SHADOW E&M-EST. PATIENT-LVL III: CPT | Mod: PBBFAC,,, | Performed by: INTERNAL MEDICINE

## 2017-09-06 NOTE — PROGRESS NOTES
Subjective:   Patient ID:  Becki Luna is a 89 y.o. female who presents for follow up of Hypertension and Fatigue      89 yo female, came in for bradycardia.  PMH HTN and HLD, walker dependent due to OA.  She went to podiatry clinic on 04/12 and the pulse wa 46 bpm. Later the day, at urgent care, EKG showed NSR NT 78 bpm, 1sy AVB, RBBB and PVC. ekg in 2010 showed NSR.  She denied chest pain, dyspnea, palpitation, dizziness, fainting and orthopnea.   Decent activity.  Metoprolol was added for PVCs  , ECHO normal EF, mild AS; Holter frequent PVCs. Pulse wnl  HOlter frequent PVCs        Past Medical History:   Diagnosis Date    Elevated cholesterol     Facial spasm     foll by opth    Hypertension     Hypothyroid     Osteoporosis 5/15 junito 5/17       Past Surgical History:   Procedure Laterality Date    CATARACT EXTRACTION         Social History   Substance Use Topics    Smoking status: Never Smoker    Smokeless tobacco: Never Used    Alcohol use No       Family History   Problem Relation Age of Onset    Diabetes Maternal Grandmother     Hypertension Sister          Review of Systems   Constitution: Negative for decreased appetite, malaise/fatigue and night sweats.   HENT: Negative for nosebleeds.    Eyes: Negative for blurred vision and double vision.   Cardiovascular: Negative for claudication, dyspnea on exertion, irregular heartbeat, leg swelling, near-syncope, orthopnea, palpitations, paroxysmal nocturnal dyspnea and syncope.   Respiratory: Negative for shortness of breath, sleep disturbances due to breathing, snoring, sputum production and wheezing.    Endocrine: Negative for cold intolerance and polyuria.   Hematologic/Lymphatic: Does not bruise/bleed easily.   Musculoskeletal: Positive for back pain. Negative for falls and joint pain.   Gastrointestinal: Negative for heartburn.   Genitourinary: Negative for dysuria, frequency and hematuria.   Neurological: Negative for difficulty with  concentration, dizziness, focal weakness, light-headedness and seizures.   Psychiatric/Behavioral: Negative for depression, memory loss and substance abuse. The patient does not have insomnia.    Allergic/Immunologic: Negative for HIV exposure and hives.       Objective:   Physical Exam   Constitutional: She is oriented to person, place, and time. She appears well-nourished.   HENT:   Head: Normocephalic.   Eyes: Pupils are equal, round, and reactive to light.   Neck: Normal carotid pulses and no JVD present. Carotid bruit is not present. No thyromegaly present.   Cardiovascular: Normal rate, regular rhythm and normal pulses.  PMI is not displaced.  Exam reveals no gallop and no S3.    Murmur heard.  Pulses:       Radial pulses are 2+ on the right side, and 2+ on the left side.   ESM on RUSB and midLSB   Pulmonary/Chest: Breath sounds normal. No stridor. No respiratory distress.   Abdominal: Soft. Bowel sounds are normal. There is no tenderness. There is no rebound.   Musculoskeletal: Normal range of motion. She exhibits edema.   1+ BLE   Neurological: She is alert and oriented to person, place, and time.   Skin: Skin is intact. No rash noted.   Psychiatric: Her behavior is normal.       Lab Results   Component Value Date    CHOL 208 (H) 10/19/2016    CHOL 150 08/26/2015    CHOL 131 08/25/2014     Lab Results   Component Value Date    HDL 56 10/19/2016    HDL 51 08/26/2015    HDL 44 08/25/2014     Lab Results   Component Value Date    LDLCALC 117.4 10/19/2016    LDLCALC 84.0 08/26/2015    LDLCALC 72.8 08/25/2014     Lab Results   Component Value Date    TRIG 173 (H) 10/19/2016    TRIG 75 08/26/2015    TRIG 71 08/25/2014     Lab Results   Component Value Date    CHOLHDL 26.9 10/19/2016    CHOLHDL 34.0 08/26/2015    CHOLHDL 33.6 08/25/2014       Chemistry        Component Value Date/Time     10/19/2016 0947    K 4.2 10/19/2016 0947     10/19/2016 0947    CO2 22 (L) 10/19/2016 0947    BUN 23 10/19/2016 0947     CREATININE 1.3 10/19/2016 0947     (H) 10/19/2016 0947        Component Value Date/Time    CALCIUM 9.6 10/19/2016 0947    ALKPHOS 98 01/25/2016 1008    AST 22 01/25/2016 1008    ALT 12 01/25/2016 1008    BILITOT 0.3 01/25/2016 1008    ESTGFRAFRICA 40.6 (A) 10/19/2016 0947    EGFRNONAA 35.2 (A) 10/19/2016 0947          Lab Results   Component Value Date    TSH 0.518 01/11/2017     No results found for: INR, PROTIME  Lab Results   Component Value Date    WBC 4.42 02/06/2014    HGB 10.5 (L) 02/06/2014    HCT 33.2 (L) 02/06/2014    MCV 93 02/06/2014     02/06/2014     BMP  Sodium   Date Value Ref Range Status   10/19/2016 144 136 - 145 mmol/L Final     Potassium   Date Value Ref Range Status   10/19/2016 4.2 3.5 - 5.1 mmol/L Final     Chloride   Date Value Ref Range Status   10/19/2016 110 95 - 110 mmol/L Final     CO2   Date Value Ref Range Status   10/19/2016 22 (L) 23 - 29 mmol/L Final     BUN, Bld   Date Value Ref Range Status   10/19/2016 23 10 - 30 mg/dL Final     Creatinine   Date Value Ref Range Status   10/19/2016 1.3 0.5 - 1.4 mg/dL Final     Calcium   Date Value Ref Range Status   10/19/2016 9.6 8.7 - 10.5 mg/dL Final     Anion Gap   Date Value Ref Range Status   10/19/2016 12 8 - 16 mmol/L Final     eGFR if    Date Value Ref Range Status   10/19/2016 40.6 (A) >60 mL/min/1.73 m^2 Final     eGFR if non    Date Value Ref Range Status   10/19/2016 35.2 (A) >60 mL/min/1.73 m^2 Final     Comment:     Calculation used to obtain the estimated glomerular filtration  rate (eGFR) is the CKD-EPI equation. Since race is unknown   in our information system, the eGFR values for   -American and Non--American patients are given   for each creatinine result.       CrCl cannot be calculated (Patient's most recent lab result is older than the maximum 7 days allowed.).     Assessment:      1. Frequent PVCs    2. Essential hypertension    3. Nonrheumatic aortic valve  stenosis      OVC improved    Plan:   Continue current meds.  Recommend heart-healthy diet, and regular exercise.  Lulu. Risk modification.   RTC in 6 months    I have reviewed all pertinent labs and cardiac studies. Plans and recommendations have been formulated under my direct supervision. All questions answered and patient voiced understanding. Patient to continue current medications.

## 2017-09-14 ENCOUNTER — OFFICE VISIT (OUTPATIENT)
Dept: PODIATRY | Facility: CLINIC | Age: 82
End: 2017-09-14
Payer: MEDICARE

## 2017-09-14 VITALS
SYSTOLIC BLOOD PRESSURE: 150 MMHG | HEART RATE: 68 BPM | BODY MASS INDEX: 22.43 KG/M2 | HEIGHT: 64 IN | DIASTOLIC BLOOD PRESSURE: 72 MMHG | WEIGHT: 131.38 LBS

## 2017-09-14 DIAGNOSIS — I73.9 PERIPHERAL VASCULAR DISEASE: ICD-10-CM

## 2017-09-14 DIAGNOSIS — B35.1 ONYCHOMYCOSIS DUE TO DERMATOPHYTE: Primary | ICD-10-CM

## 2017-09-14 PROCEDURE — 99499 UNLISTED E&M SERVICE: CPT | Mod: S$PBB,,, | Performed by: PODIATRIST

## 2017-09-14 PROCEDURE — 99999 PR PBB SHADOW E&M-EST. PATIENT-LVL III: CPT | Mod: PBBFAC,,, | Performed by: PODIATRIST

## 2017-09-14 PROCEDURE — 11721 DEBRIDE NAIL 6 OR MORE: CPT | Mod: Q8,S$PBB,, | Performed by: PODIATRIST

## 2017-09-14 PROCEDURE — 99213 OFFICE O/P EST LOW 20 MIN: CPT | Mod: PBBFAC,PO,25 | Performed by: PODIATRIST

## 2017-09-14 PROCEDURE — 11721 DEBRIDE NAIL 6 OR MORE: CPT | Mod: Q8,PBBFAC,PO | Performed by: PODIATRIST

## 2017-09-21 NOTE — PROGRESS NOTES
Subjective:     Patient ID: Becki Luna is a 89 y.o. female.    Chief Complaint: Routine Foot Care (PCP: Jun Farfan 6/12/2017, patient is accompanied by her great granddaughter )    Becki is a 89 y.o. female who presents to the clinic for evaluation and treatment of high risk feet. Becki has a past medical history of Elevated cholesterol; Facial spasm; Hypertension; Hypothyroid; and Osteoporosis (5/15 junito 5/17). The patient's chief complaint is long, thick toenails. This patient has documented high risk feet requiring routine maintenance secondary to peripheral vascular disease.    PCP: Jun Farfan MD    Date Last Seen by PCP: 6/12/17    Current shoe gear:  Affected Foot: Slip-on shoes     Unaffected Foot: Slip-on shoes    Last encounter in this department: 11/9/2016      Patient Active Problem List   Diagnosis    Hypertension    Elevated cholesterol    Hemifacial spasm - Right Eye    Lens replaced by other means - Both Eyes    Dry eyes - Both Eyes    Osteoarthritis    After-cataract, unspecified - Both Eyes    Refractive error - Both Eyes    Hypothyroidism    Osteoporosis    Subconjunctival hemorrhage of right eye    Pseudophakia of both eyes    Bradycardia    Heart murmur    Frequent PVCs    Nonrheumatic aortic valve stenosis       Medication List with Changes/Refills   Current Medications    ALBUTEROL (PROVENTIL HFA/VENTOLIN HFA) 200 PUFF INHALER    Inhale 2 puffs into the lungs 4 times daily as needed. Prn cough/wheeze    ASPIRIN ORAL    Take by mouth.    LOSARTAN-HYDROCHLOROTHIAZIDE 100-25 MG (HYZAAR) 100-25 MG PER TABLET    take 1 tablet by mouth once daily    METOPROLOL SUCCINATE (TOPROL-XL) 25 MG 24 HR TABLET    Take 1 tablet (25 mg total) by mouth once daily.    PROPYLENE GLYCOL/ (SYSTANE ULTRA OPHT)    Apply 1 drop to eye daily as needed.    SYNTHROID 137 MCG TAB TABLET    take 1 tablet by mouth once daily    TETRAHYDROZOLINE HCL (VISINE OPHT)    Apply to eye as  "needed.    TOBRAMYCIN (TOBREX) 0.3 % OPHTHALMIC SOLUTION    Place 1 drop into the right eye 4 (four) times daily.       Review of patient's allergies indicates:   Allergen Reactions    Celecoxib Nausea And Vomiting    Naproxen Rash       Past Surgical History:   Procedure Laterality Date    CATARACT EXTRACTION         Family History   Problem Relation Age of Onset    Diabetes Maternal Grandmother     Hypertension Sister        Social History     Social History    Marital status:      Spouse name: N/A    Number of children: 7    Years of education: N/A     Occupational History    Not on file.     Social History Main Topics    Smoking status: Never Smoker    Smokeless tobacco: Never Used    Alcohol use No    Drug use: No    Sexual activity: Not on file     Other Topics Concern    Not on file     Social History Narrative    No narrative on file       Vitals:    09/14/17 1048   BP: (!) 150/72   Pulse: 68   Weight: 59.6 kg (131 lb 6.3 oz)   Height: 5' 4" (1.626 m)   PainSc: 0-No pain       Review of Systems   Constitutional: Negative for chills and fever.   Respiratory: Negative for shortness of breath.    Cardiovascular: Positive for leg swelling. Negative for chest pain, palpitations, orthopnea and claudication.   Gastrointestinal: Negative for diarrhea, nausea and vomiting.   Musculoskeletal: Negative for joint pain.   Skin: Negative for rash.   Neurological: Negative for dizziness, tingling, sensory change, focal weakness and weakness.   Endo/Heme/Allergies: Bruises/bleeds easily.   Psychiatric/Behavioral: Negative.          Objective:       Objective:   PHYSICAL EXAM: Apperance: Alert and orient in no distress,well developed, and with good attention to grooming and body habits  Lower Extremity Exam:  VASCULAR: Dorsalis pedis pulses 0/4 bilateral and Posterior Tibial pulses 1/4 bilateral. Capillary fill time <4 seconds bilateral. Mild edema observed bilateral. Varicosities absent bilateral. " Skin temperature of the lower extremities is warm to cool, proximal to distal. Hair growth absent bilateral.  DERMATOLOGICAL: No skin rashes, subcutaneous nodules, lesions, or ulcers observed bilateral. Nails 1,2,3,4,5 bilateral elongated, thickened, and discolored with subungual debris. Webspaces 1-4 debris present bilateral.   NEUROLOGICAL: Light touch, sharp-dull, proprioception all present and equal bilaterally.    MUSCULOSKELETAL: Muscle strength is 5/5 for foot inverters, everters, plantarflexors, and dorsiflexors. Muscle tone is normal.         Assessment:       Encounter Diagnoses   Name Primary?    Onychomycosis due to dermatophyte Yes    Peripheral vascular disease          Plan:   Onychomycosis due to dermatophyte    Peripheral vascular disease      I counseled the patient on her conditions, regarding findings of my examination, my impressions, and usual treatment plan.   Shoe inspection. Diabetic Foot Education. Patient reminded of the importance of good nutrition and blood sugar control to help prevent podiatric complications of diabetes. Patient instructed on proper foot hygeine. We discussed wearing proper shoe gear, daily foot inspections, never walking without protective shoe gear, never putting sharp instruments to feet.    With patient's permission, nails 1-5 bilateral were debrided/trimmed in length and thickness aggressively to their soft tissue attachment mechanically and with electric , removing all offending nail and debris. Patient relates relief following the procedure.  Patient  will continue to monitor the areas daily, inspect feet, wear protective shoe gear when ambulatory, moisturizer to maintain skin integrity. Patient reminded of the importance of good nutrition and blood sugar control to help prevent podiatric complications of diabetes.  Patient to return 5 months or sooner if needed.             Cas De La Fuente DPM  [facility]

## 2017-10-05 ENCOUNTER — OFFICE VISIT (OUTPATIENT)
Dept: OPHTHALMOLOGY | Facility: CLINIC | Age: 82
End: 2017-10-05
Payer: MEDICARE

## 2017-10-05 DIAGNOSIS — G51.39 HEMIFACIAL SPASM: Primary | ICD-10-CM

## 2017-10-05 DIAGNOSIS — Z96.1 PSEUDOPHAKIA OF BOTH EYES: ICD-10-CM

## 2017-10-05 DIAGNOSIS — H04.123 DRY EYES, BILATERAL: ICD-10-CM

## 2017-10-05 DIAGNOSIS — H04.129 DRY EYE: ICD-10-CM

## 2017-10-05 PROCEDURE — 99999 PR PBB SHADOW E&M-EST. PATIENT-LVL II: CPT | Mod: PBBFAC,,, | Performed by: OPHTHALMOLOGY

## 2017-10-05 PROCEDURE — 92012 INTRM OPH EXAM EST PATIENT: CPT | Mod: S$PBB,,, | Performed by: OPHTHALMOLOGY

## 2017-10-05 PROCEDURE — 99212 OFFICE O/P EST SF 10 MIN: CPT | Mod: PBBFAC,PO | Performed by: OPHTHALMOLOGY

## 2017-10-05 NOTE — PROGRESS NOTES
SUBJECTIVE:   Becki Luna is a 89 y.o. female   Corrected distance visual acuity was 20/30 -2 in the right eye and 20/25 -2 in the left eye.   Chief Complaint   Patient presents with    Eye Problem     pt states she wakes up in the am with eyes matted shut.         HPI:  HPI     Eye Problem    Additional comments: pt states she wakes up in the am with eyes matted   shut.            Comments   Pt wakes up in the morning with eyes matted shut and burning. No pain,   just irritation. Also complains of spasms on right side of face. Pt states   its gotten worse since last injection-     1. PCIOL OU  2. CF OD > OS  3. Rt. Hemifacial Spasms Last Botox 11/16/2016       Last edited by Arturo Esquivel MD on 10/5/2017 11:09 AM.   (History)        Assessment /Plan :  1. Hemifacial spasm - Right Eye - needs Botox again- pt is unable to perform daily activities due to spasms in her face    2. Dry eyes, bilateral  Findings and symptoms consistent with mild dry eyes. Dry eye instruction sheet reviewed with patient recommending:AT's qid/titrate prn and Lubricating Oint qhs and prn     3. Pseudophakia of both eyes    4. Dry eye      RTC asap for BOTOX (will call pt to book once auth is approved)

## 2017-10-11 ENCOUNTER — PROCEDURE VISIT (OUTPATIENT)
Dept: OPHTHALMOLOGY | Facility: CLINIC | Age: 82
End: 2017-10-11
Payer: MEDICARE

## 2017-10-11 DIAGNOSIS — G51.39 HEMIFACIAL SPASM: Primary | ICD-10-CM

## 2017-10-11 PROCEDURE — 99499 UNLISTED E&M SERVICE: CPT | Mod: S$PBB,,, | Performed by: OPHTHALMOLOGY

## 2017-10-11 PROCEDURE — 64612 DESTROY NERVE FACE MUSCLE: CPT | Mod: PBBFAC,PO,RT | Performed by: OPHTHALMOLOGY

## 2017-10-11 PROCEDURE — 64612 DESTROY NERVE FACE MUSCLE: CPT | Mod: S$PBB,RT,, | Performed by: OPHTHALMOLOGY

## 2017-10-11 RX ADMIN — ONABOTULINUMTOXINA 100 UNITS: 100 INJECTION, POWDER, LYOPHILIZED, FOR SOLUTION INTRADERMAL; INTRAMUSCULAR at 12:10

## 2017-10-11 NOTE — PROGRESS NOTES
SUBJECTIVE:   Becki Luna is a 89 y.o. female   Corrected distance visual acuity was 20/40 +1 in the right eye and 20/30 -1 in the left eye.   Chief Complaint   Patient presents with    Blepharospasms     pt here for botox injections right side        HPI:  HPI     Blepharospasms    Additional comments: pt here for botox injections right side           Comments   Pt states she is having trouble reading due to the spasms on her right   side. No changes since her last visit. No ocular pain but her lids stay   swollen and sore.     1. PCIOL OU  2. CF OD > OS  3. Rt. Hemifacial Spasms Last Botox 11/16/2016       Last edited by Jerome Diaz on 10/11/2017 11:23 AM. (History)        Assessment /Plan :  1. Hemifacial spasm - Right Eye   IMP:    right  Hemifacial Spasm with difficulty with daily activities.    Procedure:  Risks, benefits and alternatives of botox chemodenervation discussed and consented. Areas of intended treatment marked with gentian violet pen and injected according to plan/diagram.    10 units IM to brow/glabellar regions  15 units SQ to upper and lower eyelids  32.5 units IM mid/lower face    57.5 units total used  (42.5 units wasted)     rtc prn

## 2017-12-04 ENCOUNTER — LAB VISIT (OUTPATIENT)
Dept: LAB | Facility: HOSPITAL | Age: 82
End: 2017-12-04
Attending: FAMILY MEDICINE
Payer: MEDICARE

## 2017-12-04 DIAGNOSIS — I10 ESSENTIAL HYPERTENSION: ICD-10-CM

## 2017-12-04 DIAGNOSIS — E03.0 CONGENITAL HYPOTHYROIDISM WITH DIFFUSE GOITER: Chronic | ICD-10-CM

## 2017-12-04 LAB
ANION GAP SERPL CALC-SCNC: 6 MMOL/L
BUN SERPL-MCNC: 23 MG/DL
CALCIUM SERPL-MCNC: 9.6 MG/DL
CHLORIDE SERPL-SCNC: 110 MMOL/L
CHOLEST SERPL-MCNC: 174 MG/DL
CHOLEST/HDLC SERPL: 3.2 {RATIO}
CO2 SERPL-SCNC: 25 MMOL/L
CREAT SERPL-MCNC: 1 MG/DL
EST. GFR  (AFRICAN AMERICAN): 54.9 ML/MIN/1.73 M^2
EST. GFR  (NON AFRICAN AMERICAN): 47.7 ML/MIN/1.73 M^2
GLUCOSE SERPL-MCNC: 75 MG/DL
HDLC SERPL-MCNC: 54 MG/DL
HDLC SERPL: 31 %
LDLC SERPL CALC-MCNC: 105 MG/DL
NONHDLC SERPL-MCNC: 120 MG/DL
POTASSIUM SERPL-SCNC: 4.7 MMOL/L
SODIUM SERPL-SCNC: 141 MMOL/L
TRIGL SERPL-MCNC: 75 MG/DL
TSH SERPL DL<=0.005 MIU/L-ACNC: 0.57 UIU/ML

## 2017-12-04 PROCEDURE — 80048 BASIC METABOLIC PNL TOTAL CA: CPT

## 2017-12-04 PROCEDURE — 36415 COLL VENOUS BLD VENIPUNCTURE: CPT | Mod: PO

## 2017-12-04 PROCEDURE — 84443 ASSAY THYROID STIM HORMONE: CPT

## 2017-12-04 PROCEDURE — 80061 LIPID PANEL: CPT

## 2017-12-11 ENCOUNTER — OFFICE VISIT (OUTPATIENT)
Dept: INTERNAL MEDICINE | Facility: CLINIC | Age: 82
End: 2017-12-11
Payer: MEDICARE

## 2017-12-11 VITALS
OXYGEN SATURATION: 98 % | HEART RATE: 78 BPM | DIASTOLIC BLOOD PRESSURE: 60 MMHG | HEIGHT: 64 IN | TEMPERATURE: 99 F | SYSTOLIC BLOOD PRESSURE: 138 MMHG | BODY MASS INDEX: 22.7 KG/M2 | WEIGHT: 132.94 LBS

## 2017-12-11 DIAGNOSIS — I10 ESSENTIAL HYPERTENSION: Primary | ICD-10-CM

## 2017-12-11 DIAGNOSIS — M81.0 OSTEOPOROSIS, UNSPECIFIED OSTEOPOROSIS TYPE, UNSPECIFIED PATHOLOGICAL FRACTURE PRESENCE: ICD-10-CM

## 2017-12-11 DIAGNOSIS — E03.0 CONGENITAL HYPOTHYROIDISM WITH DIFFUSE GOITER: Chronic | ICD-10-CM

## 2017-12-11 PROCEDURE — 99214 OFFICE O/P EST MOD 30 MIN: CPT | Mod: S$PBB,,, | Performed by: FAMILY MEDICINE

## 2017-12-11 PROCEDURE — 99999 PR PBB SHADOW E&M-EST. PATIENT-LVL III: CPT | Mod: PBBFAC,,, | Performed by: FAMILY MEDICINE

## 2017-12-11 PROCEDURE — 99213 OFFICE O/P EST LOW 20 MIN: CPT | Mod: PBBFAC,PO | Performed by: FAMILY MEDICINE

## 2017-12-11 PROCEDURE — G0008 ADMIN INFLUENZA VIRUS VAC: HCPCS | Mod: PBBFAC,PO

## 2017-12-11 NOTE — PROGRESS NOTES
Subjective:       Patient ID: Becki Luna is a . female.    Chief Complaint: Multiple issues see below    HPI Hypertension: blood pressures at home normaloff norvasc . Tolerating medicine.sees card   Hypothyroidism:  nlr tsh. No missed doses;Tolerating med. asympt  bilat chronic knee pain and occas thigh pain. Prn  bid tyl.  osteppr due dexa    Past Medical History   Diagnosis Date    Hypertension     Elevated cholesterol     Hypothyroid     Facial spasm      foll by opth     Past Surgical History   Procedure Laterality Date    Cataract extraction       Family History   Problem Relation Age of Onset    Diabetes Maternal Grandmother     Hypertension Sister      \    Review of Systems  Cardiovascular: no chest pain  Chest: no shortness of breath  Abd: no abd pain  Remainder review of systems negative    Objective:    daught here  Physical Exam   Constitutional: She is oriented to person, place, and time. She appears well-developed and well-nourished.   HENT: bilat tmac clear  Head: Normocephalic and atraumatic.   Neck: Normal range of motion. Neck supple. Carotid bruit is not present.   Cardiovascular: Normal rate and regular rhythm.    Pulmonary/Chest: Effort normal and breath sounds normal.   .   Lymphadenopathy:     She has no cervical adenopathy.   Neurological: She is alert and oriented to person, place, and time.   Skin: Skin is warm and dry.   Psychiatric: She has a normal mood and affect. Her behavior is normal. Judgment normal.       Assessment:       1. Hypothyroidism    2. Hypertension    3. Elevated cholesterol    4. Osteoporosis      djd knees  bisphos intol   Plan:     *  F/u dr aguero. Rheum osteopor/OA(she declines)  Flu shot  They prefer 6 months f/u (instead of a yr)

## 2017-12-24 RX ORDER — LEVOTHYROXINE SODIUM 137 UG/1
TABLET ORAL
Qty: 30 TABLET | Refills: 5 | Status: SHIPPED | OUTPATIENT
Start: 2017-12-24 | End: 2018-06-01 | Stop reason: SDUPTHER

## 2018-03-22 ENCOUNTER — OFFICE VISIT (OUTPATIENT)
Dept: CARDIOLOGY | Facility: CLINIC | Age: 83
End: 2018-03-22
Payer: MEDICARE

## 2018-03-22 VITALS
DIASTOLIC BLOOD PRESSURE: 72 MMHG | HEART RATE: 61 BPM | WEIGHT: 130 LBS | BODY MASS INDEX: 22.2 KG/M2 | HEIGHT: 64 IN | SYSTOLIC BLOOD PRESSURE: 130 MMHG

## 2018-03-22 DIAGNOSIS — E78.00 ELEVATED CHOLESTEROL: ICD-10-CM

## 2018-03-22 DIAGNOSIS — I45.10 RBBB: ICD-10-CM

## 2018-03-22 DIAGNOSIS — I49.3 FREQUENT PVCS: ICD-10-CM

## 2018-03-22 DIAGNOSIS — I35.0 NONRHEUMATIC AORTIC VALVE STENOSIS: ICD-10-CM

## 2018-03-22 DIAGNOSIS — R00.1 BRADYCARDIA: Primary | ICD-10-CM

## 2018-03-22 DIAGNOSIS — R05.9 COUGH: ICD-10-CM

## 2018-03-22 DIAGNOSIS — I49.5 BRADY-TACHY SYNDROME: ICD-10-CM

## 2018-03-22 PROCEDURE — 93005 ELECTROCARDIOGRAM TRACING: CPT | Mod: PBBFAC,PO | Performed by: INTERNAL MEDICINE

## 2018-03-22 PROCEDURE — 93010 ELECTROCARDIOGRAM REPORT: CPT | Mod: S$PBB,,, | Performed by: INTERNAL MEDICINE

## 2018-03-22 PROCEDURE — 99214 OFFICE O/P EST MOD 30 MIN: CPT | Mod: S$PBB,,, | Performed by: INTERNAL MEDICINE

## 2018-03-22 PROCEDURE — 99999 PR PBB SHADOW E&M-EST. PATIENT-LVL III: CPT | Mod: PBBFAC,,, | Performed by: INTERNAL MEDICINE

## 2018-03-22 PROCEDURE — 99213 OFFICE O/P EST LOW 20 MIN: CPT | Mod: PBBFAC,PO,25 | Performed by: INTERNAL MEDICINE

## 2018-03-22 NOTE — PROGRESS NOTES
Subjective:   Patient ID:  Becki Luna is a 89 y.o. female who presents for follow up of Follow-up and Hypertension      89 yo female, came in for bradycardia. Lives with her son  PMH HTN and HLD, walker dependent due to OA.  C/o b/l knee pain. Fear she could not walk safely outside.  Some dry cough.  She denied chest pain, dyspnea, palpitation, dizziness, fainting and orthopnea.   Leg swelling improved.  Today EKG showed NSR, HR 61 bpm, chronic RBBB and 1st AVB, SC interval 222 ms  Metoprolol was added for PVCs    , ECHO normal EF, mild AS;   HOlter done  very frequent mostly monomorphic PVCs totalling 3410 and averaging 142 per hour.  There were 6 polymorphic couplets        Past Medical History:   Diagnosis Date    Elevated cholesterol     Facial spasm     foll by opth    Hypertension     Hypothyroid     Osteoporosis 5/15 junito 5/17       Past Surgical History:   Procedure Laterality Date    CATARACT EXTRACTION         Social History   Substance Use Topics    Smoking status: Never Smoker    Smokeless tobacco: Never Used    Alcohol use No       Family History   Problem Relation Age of Onset    Diabetes Maternal Grandmother     Hypertension Sister          Review of Systems   Constitution: Negative for decreased appetite, malaise/fatigue and night sweats.   HENT: Negative for nosebleeds.    Eyes: Negative for blurred vision and double vision.   Cardiovascular: Negative for claudication, dyspnea on exertion, irregular heartbeat, leg swelling, near-syncope, orthopnea, palpitations, paroxysmal nocturnal dyspnea and syncope.   Respiratory: Negative for shortness of breath, sleep disturbances due to breathing, snoring, sputum production and wheezing.    Endocrine: Negative for cold intolerance and polyuria.   Hematologic/Lymphatic: Does not bruise/bleed easily.   Musculoskeletal: Positive for back pain. Negative for falls and joint pain.   Gastrointestinal: Negative for heartburn.    Genitourinary: Negative for dysuria, frequency and hematuria.   Neurological: Negative for difficulty with concentration, dizziness, focal weakness, light-headedness and seizures.   Psychiatric/Behavioral: Negative for depression, memory loss and substance abuse. The patient does not have insomnia.    Allergic/Immunologic: Negative for HIV exposure and hives.       Objective:   Physical Exam   Constitutional: She is oriented to person, place, and time. She appears well-nourished.   HENT:   Head: Normocephalic.   Eyes: Pupils are equal, round, and reactive to light.   Neck: Normal carotid pulses and no JVD present. Carotid bruit is not present. No thyromegaly present.   Cardiovascular: Normal rate, regular rhythm and normal pulses.  PMI is not displaced.  Exam reveals no gallop and no S3.    Murmur heard.  Pulses:       Radial pulses are 2+ on the right side, and 2+ on the left side.   ESM on RUSB and midLSB   Pulmonary/Chest: Breath sounds normal. No stridor. No respiratory distress.   Left lung wheezing   Abdominal: Soft. Bowel sounds are normal. There is no tenderness. There is no rebound.   Musculoskeletal: Normal range of motion. She exhibits no edema.   Neurological: She is alert and oriented to person, place, and time.   Skin: Skin is intact. No rash noted.   Psychiatric: Her behavior is normal.       Lab Results   Component Value Date    CHOL 174 12/04/2017    CHOL 208 (H) 10/19/2016    CHOL 150 08/26/2015     Lab Results   Component Value Date    HDL 54 12/04/2017    HDL 56 10/19/2016    HDL 51 08/26/2015     Lab Results   Component Value Date    LDLCALC 105.0 12/04/2017    LDLCALC 117.4 10/19/2016    LDLCALC 84.0 08/26/2015     Lab Results   Component Value Date    TRIG 75 12/04/2017    TRIG 173 (H) 10/19/2016    TRIG 75 08/26/2015     Lab Results   Component Value Date    CHOLHDL 31.0 12/04/2017    CHOLHDL 26.9 10/19/2016    CHOLHDL 34.0 08/26/2015       Chemistry        Component Value Date/Time    NA  141 12/04/2017 1116    K 4.7 12/04/2017 1116     12/04/2017 1116    CO2 25 12/04/2017 1116    BUN 23 12/04/2017 1116    CREATININE 1.0 12/04/2017 1116    GLU 75 12/04/2017 1116        Component Value Date/Time    CALCIUM 9.6 12/04/2017 1116    ALKPHOS 98 01/25/2016 1008    AST 22 01/25/2016 1008    ALT 12 01/25/2016 1008    BILITOT 0.3 01/25/2016 1008    ESTGFRAFRICA 54.9 (A) 12/04/2017 1116    EGFRNONAA 47.7 (A) 12/04/2017 1116          Lab Results   Component Value Date    TSH 0.568 12/04/2017     No results found for: INR, PROTIME  Lab Results   Component Value Date    WBC 4.42 02/06/2014    HGB 10.5 (L) 02/06/2014    HCT 33.2 (L) 02/06/2014    MCV 93 02/06/2014     02/06/2014     BMP  Sodium   Date Value Ref Range Status   12/04/2017 141 136 - 145 mmol/L Final     Potassium   Date Value Ref Range Status   12/04/2017 4.7 3.5 - 5.1 mmol/L Final     Chloride   Date Value Ref Range Status   12/04/2017 110 95 - 110 mmol/L Final     CO2   Date Value Ref Range Status   12/04/2017 25 23 - 29 mmol/L Final     BUN, Bld   Date Value Ref Range Status   12/04/2017 23 10 - 30 mg/dL Final     Creatinine   Date Value Ref Range Status   12/04/2017 1.0 0.5 - 1.4 mg/dL Final     Calcium   Date Value Ref Range Status   12/04/2017 9.6 8.7 - 10.5 mg/dL Final     Anion Gap   Date Value Ref Range Status   12/04/2017 6 (L) 8 - 16 mmol/L Final     eGFR if    Date Value Ref Range Status   12/04/2017 54.9 (A) >60 mL/min/1.73 m^2 Final     eGFR if non    Date Value Ref Range Status   12/04/2017 47.7 (A) >60 mL/min/1.73 m^2 Final     Comment:     Calculation used to obtain the estimated glomerular filtration  rate (eGFR) is the CKD-EPI equation.        CrCl cannot be calculated (Patient's most recent lab result is older than the maximum 7 days allowed.).     Assessment:      1. Bradycardia    2. Frequent PVCs    3. Elevated cholesterol    4. Nonrheumatic aortic valve stenosis    5. Cough       Abdiel controlled   PVC improved  Cough with left lung wheezing for 3 days, no fever and sputum. ? Viral infection  Plan:   If cough > 7 days contact PCP  Continue metoprolol, Hyzaar and ASA 81 mg  Fall precaution  RTC in 6 months

## 2018-04-02 ENCOUNTER — TELEPHONE (OUTPATIENT)
Dept: INTERNAL MEDICINE | Facility: CLINIC | Age: 83
End: 2018-04-02

## 2018-04-02 NOTE — TELEPHONE ENCOUNTER
----- Message from Stacie Guzman sent at 4/2/2018 11:16 AM CDT -----  Contact: Patient  Patient needs a hospital follow up for syncope, but there are no openings until May, please call her back at [phone]. Thank you

## 2018-04-02 NOTE — TELEPHONE ENCOUNTER
----- Message from Kai Simeon sent at 4/2/2018 12:00 PM CDT -----  Contact: pt  She's calling in regards to a missed call, [phone] (home)

## 2018-05-09 DIAGNOSIS — I49.3 FREQUENT PVCS: ICD-10-CM

## 2018-05-09 RX ORDER — METOPROLOL SUCCINATE 25 MG/1
TABLET, EXTENDED RELEASE ORAL
Qty: 30 TABLET | Refills: 11 | Status: SHIPPED | OUTPATIENT
Start: 2018-05-09 | End: 2019-02-07 | Stop reason: SDUPTHER

## 2018-05-21 ENCOUNTER — PATIENT OUTREACH (OUTPATIENT)
Dept: ADMINISTRATIVE | Facility: HOSPITAL | Age: 83
End: 2018-05-21

## 2018-05-31 ENCOUNTER — LAB VISIT (OUTPATIENT)
Dept: LAB | Facility: HOSPITAL | Age: 83
End: 2018-05-31
Attending: FAMILY MEDICINE
Payer: MEDICARE

## 2018-05-31 ENCOUNTER — OFFICE VISIT (OUTPATIENT)
Dept: INTERNAL MEDICINE | Facility: CLINIC | Age: 83
End: 2018-05-31
Payer: MEDICARE

## 2018-05-31 VITALS
WEIGHT: 129.44 LBS | TEMPERATURE: 98 F | BODY MASS INDEX: 22.1 KG/M2 | OXYGEN SATURATION: 99 % | HEIGHT: 64 IN | HEART RATE: 74 BPM | DIASTOLIC BLOOD PRESSURE: 60 MMHG | SYSTOLIC BLOOD PRESSURE: 146 MMHG

## 2018-05-31 DIAGNOSIS — D64.9 ANEMIA, UNSPECIFIED TYPE: ICD-10-CM

## 2018-05-31 DIAGNOSIS — M81.0 OSTEOPOROSIS, UNSPECIFIED OSTEOPOROSIS TYPE, UNSPECIFIED PATHOLOGICAL FRACTURE PRESENCE: ICD-10-CM

## 2018-05-31 DIAGNOSIS — E03.0 CONGENITAL HYPOTHYROIDISM WITH DIFFUSE GOITER: Chronic | ICD-10-CM

## 2018-05-31 DIAGNOSIS — I10 ESSENTIAL HYPERTENSION: Primary | ICD-10-CM

## 2018-05-31 LAB
BASOPHILS # BLD AUTO: 0.02 K/UL
BASOPHILS NFR BLD: 0.5 %
DIFFERENTIAL METHOD: ABNORMAL
EOSINOPHIL # BLD AUTO: 0.1 K/UL
EOSINOPHIL NFR BLD: 1.4 %
ERYTHROCYTE [DISTWIDTH] IN BLOOD BY AUTOMATED COUNT: 15.1 %
HCT VFR BLD AUTO: 31.2 %
HGB BLD-MCNC: 9.8 G/DL
IMM GRANULOCYTES # BLD AUTO: 0.01 K/UL
IMM GRANULOCYTES NFR BLD AUTO: 0.2 %
LYMPHOCYTES # BLD AUTO: 1 K/UL
LYMPHOCYTES NFR BLD: 22.5 %
MCH RBC QN AUTO: 30.1 PG
MCHC RBC AUTO-ENTMCNC: 31.4 G/DL
MCV RBC AUTO: 96 FL
MONOCYTES # BLD AUTO: 0.6 K/UL
MONOCYTES NFR BLD: 12.6 %
NEUTROPHILS # BLD AUTO: 2.7 K/UL
NEUTROPHILS NFR BLD: 62.8 %
NRBC BLD-RTO: 0 /100 WBC
PLATELET # BLD AUTO: 201 K/UL
PMV BLD AUTO: 10.5 FL
RBC # BLD AUTO: 3.26 M/UL
T4 FREE SERPL-MCNC: 1.64 NG/DL
TSH SERPL DL<=0.005 MIU/L-ACNC: 0.1 UIU/ML
VIT B12 SERPL-MCNC: 161 PG/ML
WBC # BLD AUTO: 4.36 K/UL

## 2018-05-31 PROCEDURE — 99214 OFFICE O/P EST MOD 30 MIN: CPT | Mod: S$PBB,,, | Performed by: FAMILY MEDICINE

## 2018-05-31 PROCEDURE — 85025 COMPLETE CBC W/AUTO DIFF WBC: CPT

## 2018-05-31 PROCEDURE — 84439 ASSAY OF FREE THYROXINE: CPT

## 2018-05-31 PROCEDURE — 99999 PR PBB SHADOW E&M-EST. PATIENT-LVL III: CPT | Mod: PBBFAC,,, | Performed by: FAMILY MEDICINE

## 2018-05-31 PROCEDURE — 99213 OFFICE O/P EST LOW 20 MIN: CPT | Mod: PBBFAC,PO | Performed by: FAMILY MEDICINE

## 2018-05-31 PROCEDURE — 84443 ASSAY THYROID STIM HORMONE: CPT

## 2018-05-31 PROCEDURE — 82607 VITAMIN B-12: CPT

## 2018-05-31 PROCEDURE — 83921 ORGANIC ACID SINGLE QUANT: CPT

## 2018-05-31 NOTE — PROGRESS NOTES
Subjective:       Patient ID: Becki Luna is a 89 y.o. female.    Chief Complaint: No chief complaint on file.    HPIolol f/u 3/18 ED visit for syncope.w/u ok. Fine since. Noted mild inc tsh and stable chr anemia borderline b12  Past Medical History:   Diagnosis Date    Elevated cholesterol     Facial spasm     foll by opth    Hypertension     Hypothyroid     Osteoporosis 5/15 junito 5/17     Past Surgical History:   Procedure Laterality Date    CATARACT EXTRACTION       Family History   Problem Relation Age of Onset    Diabetes Maternal Grandmother     Hypertension Sister      Social History     Social History    Marital status:      Spouse name: N/A    Number of children: 7    Years of education: N/A     Social History Main Topics    Smoking status: Never Smoker    Smokeless tobacco: Never Used    Alcohol use No    Drug use: No    Sexual activity: Not Asked     Other Topics Concern    None     Social History Narrative    None       Review of Systems  no cp sob  Objective:    granddaught here  Physical Exam  cvrrr  Chest clear  Assessment:       1. Essential hypertension    2. Congenital hypothyroidism with diffuse goiter    3. Osteoporosis, unspecified osteoporosis type, unspecified pathological fracture presence    4. Anemia, unspecified type        Plan:       **re: osteoporand  F/u dr aguero. Rheum osteopor/OA(she declines)    They prefer 6 months f/u (instead of a yr)*    Lab today  Lab and follow up after in 6 months  Essential hypertension  -     Basic metabolic panel; Future; Expected date: 11/27/2018  -     Lipid panel; Future; Expected date: 11/27/2018    Congenital hypothyroidism with diffuse goiter  -     TSH; Future; Expected date: 11/27/2018  -     TSH; Future; Expected date: 05/31/2018  -     Vitamin B12; Future; Expected date: 05/31/2018    Osteoporosis, unspecified osteoporosis type, unspecified pathological fracture presence    Anemia, unspecified type  -     CBC auto  differential; Future; Expected date: 05/31/2018  -     Vitamin B12; Future; Expected date: 05/31/2018  -     Methylmalonic acid, serum; Future; Expected date: 05/31/2018

## 2018-06-01 ENCOUNTER — PATIENT MESSAGE (OUTPATIENT)
Dept: INTERNAL MEDICINE | Facility: CLINIC | Age: 83
End: 2018-06-01

## 2018-06-01 DIAGNOSIS — D64.9 ANEMIA, UNSPECIFIED TYPE: Primary | ICD-10-CM

## 2018-06-01 RX ORDER — LEVOTHYROXINE SODIUM 125 UG/1
125 TABLET ORAL DAILY
Qty: 30 TABLET | Refills: 5 | Status: SHIPPED | OUTPATIENT
Start: 2018-06-01 | End: 2018-07-30 | Stop reason: SDUPTHER

## 2018-06-01 NOTE — TELEPHONE ENCOUNTER
Please let her (hard of hearing) that getting too much thyroid medicine  Stop synthoid 137ug;  And start 125ug erxd  Vanessa tsh, b12 cbc 6 weeks    Also b12 level is low. Need b12 1000micrograms IM weekly x 4 then monthly

## 2018-06-05 ENCOUNTER — TELEPHONE (OUTPATIENT)
Dept: INTERNAL MEDICINE | Facility: CLINIC | Age: 83
End: 2018-06-05

## 2018-06-05 LAB — METHYLMALONATE SERPL-SCNC: 1.23 UMOL/L

## 2018-06-06 ENCOUNTER — TELEPHONE (OUTPATIENT)
Dept: INTERNAL MEDICINE | Facility: CLINIC | Age: 83
End: 2018-06-06

## 2018-06-06 NOTE — TELEPHONE ENCOUNTER
----- Message from Caroline Severino sent at 6/6/2018  9:26 AM CDT -----  Contact: pt  States the pt is returning a missed call, the pt can be reached at [phone]///thxMW

## 2018-06-06 NOTE — TELEPHONE ENCOUNTER
----- Message from Oni Iglesias sent at 6/6/2018 11:37 AM CDT -----  Contact: DaughterCharlette Gonzalez- [phone]   Returning call,please call back at [phone].  Thx-AH

## 2018-06-07 ENCOUNTER — CLINICAL SUPPORT (OUTPATIENT)
Dept: INTERNAL MEDICINE | Facility: CLINIC | Age: 83
End: 2018-06-07
Payer: MEDICARE

## 2018-06-07 DIAGNOSIS — E53.8 B12 DEFICIENCY: ICD-10-CM

## 2018-06-07 PROCEDURE — 99999 PR PBB SHADOW E&M-EST. PATIENT-LVL I: CPT | Mod: PBBFAC,,,

## 2018-06-07 PROCEDURE — 99211 OFF/OP EST MAY X REQ PHY/QHP: CPT | Mod: PBBFAC,PO

## 2018-06-07 PROCEDURE — 96372 THER/PROPH/DIAG INJ SC/IM: CPT | Mod: PBBFAC,PO

## 2018-06-07 RX ORDER — CYANOCOBALAMIN 1000 UG/ML
1000 INJECTION, SOLUTION INTRAMUSCULAR; SUBCUTANEOUS WEEKLY
Status: ACTIVE | OUTPATIENT
Start: 2018-06-07 | End: 2018-07-05

## 2018-06-07 RX ADMIN — CYANOCOBALAMIN 1000 MCG: 1000 INJECTION, SOLUTION INTRAMUSCULAR at 11:06

## 2018-06-07 NOTE — PROGRESS NOTES
Requested pt to remain in room 15 minutes to monitor for s/s adverse reactions.  Pt tolerated injection well.

## 2018-06-08 ENCOUNTER — TELEPHONE (OUTPATIENT)
Dept: INTERNAL MEDICINE | Facility: CLINIC | Age: 83
End: 2018-06-08

## 2018-06-08 NOTE — TELEPHONE ENCOUNTER
I returned a call to the pts daughter Lisa after calling the pharmacy to verify that the levothyroxine 125mcg has been filled the y said yes and picked up on 06/05/18 .I informed the daughter she verbalized understanding.

## 2018-06-08 NOTE — TELEPHONE ENCOUNTER
Linda   There is # that they gave for you to please call and will need to know the med they are referring to

## 2018-06-08 NOTE — TELEPHONE ENCOUNTER
----- Message from Stacie Guzman sent at 6/8/2018  8:02 AM CDT -----  Contact: Lisa Nuñez  Ms Gonzalez states that [facility] has not recieved the thyroid medication, please call Ms Gonzalez at [phone]. Thank you

## 2018-06-14 ENCOUNTER — CLINICAL SUPPORT (OUTPATIENT)
Dept: INTERNAL MEDICINE | Facility: CLINIC | Age: 83
End: 2018-06-14
Payer: MEDICARE

## 2018-06-14 PROCEDURE — 96372 THER/PROPH/DIAG INJ SC/IM: CPT | Mod: PBBFAC,PO

## 2018-06-14 RX ADMIN — CYANOCOBALAMIN 1000 MCG: 1000 INJECTION, SOLUTION INTRAMUSCULAR at 11:06

## 2018-07-19 ENCOUNTER — LAB VISIT (OUTPATIENT)
Dept: LAB | Facility: HOSPITAL | Age: 83
End: 2018-07-19
Attending: FAMILY MEDICINE
Payer: MEDICARE

## 2018-07-19 DIAGNOSIS — D64.9 ANEMIA, UNSPECIFIED TYPE: ICD-10-CM

## 2018-07-19 LAB
BASOPHILS # BLD AUTO: 0.01 K/UL
BASOPHILS NFR BLD: 0.2 %
DIFFERENTIAL METHOD: ABNORMAL
EOSINOPHIL # BLD AUTO: 0.1 K/UL
EOSINOPHIL NFR BLD: 2.4 %
ERYTHROCYTE [DISTWIDTH] IN BLOOD BY AUTOMATED COUNT: 14 %
HCT VFR BLD AUTO: 32 %
HGB BLD-MCNC: 9.8 G/DL
IMM GRANULOCYTES # BLD AUTO: 0.01 K/UL
IMM GRANULOCYTES NFR BLD AUTO: 0.2 %
LYMPHOCYTES # BLD AUTO: 1.1 K/UL
LYMPHOCYTES NFR BLD: 26.4 %
MCH RBC QN AUTO: 30.1 PG
MCHC RBC AUTO-ENTMCNC: 30.6 G/DL
MCV RBC AUTO: 98 FL
MONOCYTES # BLD AUTO: 0.4 K/UL
MONOCYTES NFR BLD: 9.9 %
NEUTROPHILS # BLD AUTO: 2.5 K/UL
NEUTROPHILS NFR BLD: 60.9 %
NRBC BLD-RTO: 0 /100 WBC
PLATELET # BLD AUTO: 221 K/UL
PMV BLD AUTO: 10 FL
RBC # BLD AUTO: 3.26 M/UL
T4 FREE SERPL-MCNC: 1.12 NG/DL
TSH SERPL DL<=0.005 MIU/L-ACNC: 0.29 UIU/ML
VIT B12 SERPL-MCNC: 517 PG/ML
WBC # BLD AUTO: 4.13 K/UL

## 2018-07-19 PROCEDURE — 84443 ASSAY THYROID STIM HORMONE: CPT

## 2018-07-19 PROCEDURE — 84439 ASSAY OF FREE THYROXINE: CPT

## 2018-07-19 PROCEDURE — 36415 COLL VENOUS BLD VENIPUNCTURE: CPT | Mod: PO

## 2018-07-19 PROCEDURE — 85025 COMPLETE CBC W/AUTO DIFF WBC: CPT

## 2018-07-19 PROCEDURE — 82607 VITAMIN B-12: CPT

## 2018-07-30 ENCOUNTER — PATIENT MESSAGE (OUTPATIENT)
Dept: INTERNAL MEDICINE | Facility: CLINIC | Age: 83
End: 2018-07-30

## 2018-07-30 DIAGNOSIS — E03.4 HYPOTHYROIDISM DUE TO ACQUIRED ATROPHY OF THYROID: Primary | ICD-10-CM

## 2018-07-30 RX ORDER — LEVOTHYROXINE SODIUM 112 UG/1
112 TABLET ORAL DAILY
Qty: 30 TABLET | Refills: 11 | Status: SHIPPED | OUTPATIENT
Start: 2018-07-30 | End: 2018-12-06

## 2018-07-30 NOTE — TELEPHONE ENCOUNTER
Please let her or caregiver know that getting bit too much thyroid med  If taking 125ug daily then change to 112 ug daily synthroid (printed)  And recheck tsh 6 weeks

## 2018-09-13 ENCOUNTER — OFFICE VISIT (OUTPATIENT)
Dept: PODIATRY | Facility: CLINIC | Age: 83
End: 2018-09-13
Payer: MEDICARE

## 2018-09-13 VITALS
DIASTOLIC BLOOD PRESSURE: 77 MMHG | SYSTOLIC BLOOD PRESSURE: 159 MMHG | HEART RATE: 76 BPM | BODY MASS INDEX: 22.13 KG/M2 | HEIGHT: 64 IN | WEIGHT: 129.63 LBS

## 2018-09-13 DIAGNOSIS — I73.9 PERIPHERAL VASCULAR DISEASE: ICD-10-CM

## 2018-09-13 DIAGNOSIS — B35.1 ONYCHOMYCOSIS DUE TO DERMATOPHYTE: Primary | ICD-10-CM

## 2018-09-13 PROCEDURE — 11721 DEBRIDE NAIL 6 OR MORE: CPT | Mod: Q8,S$PBB,, | Performed by: PODIATRIST

## 2018-09-13 PROCEDURE — 99213 OFFICE O/P EST LOW 20 MIN: CPT | Mod: S$PBB,25,, | Performed by: PODIATRIST

## 2018-09-13 PROCEDURE — 99213 OFFICE O/P EST LOW 20 MIN: CPT | Mod: PBBFAC,PO | Performed by: PODIATRIST

## 2018-09-13 PROCEDURE — 11721 DEBRIDE NAIL 6 OR MORE: CPT | Mod: Q8,PBBFAC,PO | Performed by: PODIATRIST

## 2018-09-13 PROCEDURE — 99999 PR PBB SHADOW E&M-EST. PATIENT-LVL III: CPT | Mod: PBBFAC,,, | Performed by: PODIATRIST

## 2018-09-18 ENCOUNTER — OFFICE VISIT (OUTPATIENT)
Dept: CARDIOLOGY | Facility: CLINIC | Age: 83
End: 2018-09-18
Payer: MEDICARE

## 2018-09-18 ENCOUNTER — HOSPITAL ENCOUNTER (OUTPATIENT)
Dept: RADIOLOGY | Facility: HOSPITAL | Age: 83
Discharge: HOME | End: 2018-09-18
Attending: INTERNAL MEDICINE
Payer: MEDICARE

## 2018-09-18 VITALS
WEIGHT: 128.06 LBS | SYSTOLIC BLOOD PRESSURE: 160 MMHG | HEART RATE: 64 BPM | DIASTOLIC BLOOD PRESSURE: 74 MMHG | BODY MASS INDEX: 21.86 KG/M2 | HEIGHT: 64 IN
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
## 2018-09-18 DIAGNOSIS — I35.0 NONRHEUMATIC AORTIC VALVE STENOSIS: ICD-10-CM

## 2018-09-18 DIAGNOSIS — E78.00 ELEVATED CHOLESTEROL: ICD-10-CM

## 2018-09-18 DIAGNOSIS — R05.9 COUGH: ICD-10-CM

## 2018-09-18 DIAGNOSIS — I49.3 FREQUENT PVCS: ICD-10-CM

## 2018-09-18 DIAGNOSIS — I45.10 RBBB: ICD-10-CM

## 2018-09-18 DIAGNOSIS — I10 ESSENTIAL HYPERTENSION: Primary | ICD-10-CM

## 2018-09-18 PROCEDURE — 99214 OFFICE O/P EST MOD 30 MIN: CPT | Mod: S$PBB,,, | Performed by: INTERNAL MEDICINE

## 2018-09-18 PROCEDURE — 71046 X-RAY EXAM CHEST 2 VIEWS: CPT | Mod: 26,,, | Performed by: RADIOLOGY

## 2018-09-18 PROCEDURE — 73030 X-RAY EXAM OF SHOULDER: CPT | Mod: 26,RT,, | Performed by: RADIOLOGY

## 2018-09-18 PROCEDURE — 71046 X-RAY EXAM CHEST 2 VIEWS: CPT | Mod: TC,FY,PO

## 2018-09-18 PROCEDURE — 99999 PR PBB SHADOW E&M-EST. PATIENT-LVL III: CPT | Mod: PBBFAC,,, | Performed by: INTERNAL MEDICINE

## 2018-09-18 PROCEDURE — 73030 X-RAY EXAM OF SHOULDER: CPT | Mod: TC,FY,PO,RT

## 2018-09-18 PROCEDURE — 99213 OFFICE O/P EST LOW 20 MIN: CPT | Mod: PBBFAC,PO | Performed by: INTERNAL MEDICINE

## 2018-09-18 NOTE — PROGRESS NOTES
Subjective:   Patient ID:  Becki Luna is a 89 y.o. female who presents for follow up of Follow-up      89 yo female, came in for bradycardia. Lives with her son  PMH HTN and HLD, walker dependent due to OA. Slipped and fell in the office. Landed on the buttock. C/o right shoulder pain . No hip pain.  C/o b/l knee pain. Fears she could not walk safely outside.  Dry cough at night  She denied chest pain, dyspnea, palpitation, dizziness, fainting and orthopnea.   Leg swelling improved.  Metoprolol was added for PVCs    , ECHO normal EF, mild AS;   HOlter done  very frequent mostly monomorphic PVCs totalling 3410 and averaging 142 per hour.  There were 6 polymorphic couplets  EKG showed NSR, HR 61 bpm, chronic RBBB and 1st AVB, ID interval 222 ms            Past Medical History:   Diagnosis Date    Elevated cholesterol     Facial spasm     foll by opth    Hypertension     Hypothyroid     Osteoporosis 5/15 junito 5/17       Past Surgical History:   Procedure Laterality Date    CATARACT EXTRACTION         Social History     Tobacco Use    Smoking status: Never Smoker    Smokeless tobacco: Never Used   Substance Use Topics    Alcohol use: No    Drug use: No       Family History   Problem Relation Age of Onset    Diabetes Maternal Grandmother     Hypertension Sister          Review of Systems   Constitution: Negative for decreased appetite, malaise/fatigue and night sweats.   HENT: Negative for nosebleeds.    Eyes: Negative for blurred vision and double vision.   Cardiovascular: Negative for claudication, dyspnea on exertion, irregular heartbeat, leg swelling, near-syncope, orthopnea, palpitations, paroxysmal nocturnal dyspnea and syncope.   Respiratory: Positive for cough. Negative for shortness of breath, sleep disturbances due to breathing, snoring, sputum production and wheezing.    Endocrine: Negative for cold intolerance and polyuria.   Hematologic/Lymphatic: Does not bruise/bleed easily.    Musculoskeletal: Positive for back pain. Negative for falls and joint pain.   Gastrointestinal: Negative for heartburn.   Genitourinary: Negative for dysuria, frequency and hematuria.   Neurological: Negative for difficulty with concentration, dizziness, focal weakness, light-headedness and seizures.   Psychiatric/Behavioral: Negative for depression, memory loss and substance abuse. The patient does not have insomnia.    Allergic/Immunologic: Negative for HIV exposure and hives.       Objective:   Physical Exam   Constitutional: She is oriented to person, place, and time. She appears well-nourished.   HENT:   Head: Normocephalic.   Eyes: Pupils are equal, round, and reactive to light.   Neck: Normal carotid pulses and no JVD present. Carotid bruit is not present. No thyromegaly present.   Cardiovascular: Normal rate, regular rhythm and normal pulses. PMI is not displaced. Exam reveals no gallop and no S3.   Murmur heard.  Pulses:       Radial pulses are 2+ on the right side, and 2+ on the left side.   ESM on RUSB and midLSB   Pulmonary/Chest: Breath sounds normal. No stridor. No respiratory distress.   Left lung wheezing   Abdominal: Soft. Bowel sounds are normal. There is no tenderness. There is no rebound.   Musculoskeletal: Normal range of motion. She exhibits no edema.   Neurological: She is alert and oriented to person, place, and time.   Skin: Skin is intact. No rash noted.   Psychiatric: Her behavior is normal.       Lab Results   Component Value Date    CHOL 174 12/04/2017    CHOL 208 (H) 10/19/2016    CHOL 150 08/26/2015     Lab Results   Component Value Date    HDL 54 12/04/2017    HDL 56 10/19/2016    HDL 51 08/26/2015     Lab Results   Component Value Date    LDLCALC 105.0 12/04/2017    LDLCALC 117.4 10/19/2016    LDLCALC 84.0 08/26/2015     Lab Results   Component Value Date    TRIG 75 12/04/2017    TRIG 173 (H) 10/19/2016    TRIG 75 08/26/2015     Lab Results   Component Value Date    CHOLHDL 31.0  12/04/2017    CHOLHDL 26.9 10/19/2016    CHOLHDL 34.0 08/26/2015       Chemistry        Component Value Date/Time     12/04/2017 1116    K 4.7 12/04/2017 1116     12/04/2017 1116    CO2 25 12/04/2017 1116    BUN 23 12/04/2017 1116    CREATININE 1.0 12/04/2017 1116    GLU 75 12/04/2017 1116        Component Value Date/Time    CALCIUM 9.6 12/04/2017 1116    ALKPHOS 98 01/25/2016 1008    AST 22 01/25/2016 1008    ALT 12 01/25/2016 1008    BILITOT 0.3 01/25/2016 1008    ESTGFRAFRICA 54.9 (A) 12/04/2017 1116    EGFRNONAA 47.7 (A) 12/04/2017 1116          No results found for: LABA1C, HGBA1C  Lab Results   Component Value Date    TSH 0.289 (L) 07/19/2018     No results found for: INR, PROTIME  Lab Results   Component Value Date    WBC 4.13 07/19/2018    HGB 9.8 (L) 07/19/2018    HCT 32.0 (L) 07/19/2018    MCV 98 07/19/2018     07/19/2018     BMP  Sodium   Date Value Ref Range Status   12/04/2017 141 136 - 145 mmol/L Final     Potassium   Date Value Ref Range Status   12/04/2017 4.7 3.5 - 5.1 mmol/L Final     Chloride   Date Value Ref Range Status   12/04/2017 110 95 - 110 mmol/L Final     CO2   Date Value Ref Range Status   12/04/2017 25 23 - 29 mmol/L Final     BUN, Bld   Date Value Ref Range Status   12/04/2017 23 10 - 30 mg/dL Final     Creatinine   Date Value Ref Range Status   12/04/2017 1.0 0.5 - 1.4 mg/dL Final     Calcium   Date Value Ref Range Status   12/04/2017 9.6 8.7 - 10.5 mg/dL Final     Anion Gap   Date Value Ref Range Status   12/04/2017 6 (L) 8 - 16 mmol/L Final     eGFR if    Date Value Ref Range Status   12/04/2017 54.9 (A) >60 mL/min/1.73 m^2 Final     eGFR if non    Date Value Ref Range Status   12/04/2017 47.7 (A) >60 mL/min/1.73 m^2 Final     Comment:     Calculation used to obtain the estimated glomerular filtration  rate (eGFR) is the CKD-EPI equation.        BNP  @LABRCNTIP(BNP,BNPTRIAGEBLO)@  @LABRCNTIP(troponini)@  CrCl cannot be calculated  (Patient's most recent lab result is older than the maximum 7 days allowed.).  No results found in the last 24 hours.  No results found in the last 24 hours.  No results found in the last 24 hours.    Assessment:      1. Essential hypertension    2. Frequent PVCs    3. Elevated cholesterol    4. Nonrheumatic aortic valve stenosis    5. RBBB    6. Fall, initial encounter    7. Cough      SBP < 160 mmHG acceptable.    Plan:   CXR for cough  Right shoulder XRAY to r/o Fx.  radha  Continue Hyzaar and metoprolol  RTC in 6 months  Advise to check BP at home. Ok to keep SBP < 150 to 160 mmHg

## 2018-09-23 NOTE — PROGRESS NOTES
Subjective:     Patient ID: Becki Luna is a 89 y.o. female.    Chief Complaint: Routine Foot Care (PCP: LILLI Farfan 5/31/2018 , nail care/feet check )    Becki is a 89 y.o. female who presents to the clinic for evaluation and treatment of high risk feet. Becki has a past medical history of Elevated cholesterol, Facial spasm, Hypertension, Hypothyroid, and Osteoporosis (5/15 junito 5/17). The patient's chief complaint is long, thick toenails. This patient has documented high risk feet requiring routine maintenance secondary to peripheral vascular disease.    PCP: Jun Farfan MD    Date Last Seen by PCP: 5/31/18    Current shoe gear:  Affected Foot: Slip-on shoes     Unaffected Foot: Slip-on shoes    Last encounter in this department: 11/9/2016      Patient Active Problem List   Diagnosis    Hypertension    Elevated cholesterol    Hemifacial spasm - Right Eye    Lens replaced by other means - Both Eyes    Dry eyes - Both Eyes    Osteoarthritis    After-cataract, unspecified - Both Eyes    Refractive error - Both Eyes    Hypothyroidism    Osteoporosis    Subconjunctival hemorrhage of right eye    Pseudophakia of both eyes    Bradycardia    Heart murmur    Frequent PVCs    Nonrheumatic aortic valve stenosis    Cough    RBBB    B12 deficiency    Fall          Medication List           Accurate as of 9/13/18 11:59 PM. If you have any questions, ask your nurse or doctor.               CONTINUE taking these medications    albuterol 90 mcg/actuation inhaler  Commonly known as:  PROVENTIL/VENTOLIN HFA     ASPIRIN ORAL     levothyroxine 112 MCG tablet  Commonly known as:  SYNTHROID  Take 1 tablet (112 mcg total) by mouth once daily.     losartan-hydrochlorothiazide 100-25 mg 100-25 mg per tablet  Commonly known as:  HYZAAR  take 1 tablet by mouth once daily     metoprolol succinate 25 MG 24 hr tablet  Commonly known as:  TOPROL-XL  take 1 tablet by mouth once daily     SYSTANE ULTRA OPHT    "  VISINE OPHT            Review of patient's allergies indicates:   Allergen Reactions    Celecoxib Nausea And Vomiting    Naproxen Rash       Past Surgical History:   Procedure Laterality Date    CATARACT EXTRACTION         Family History   Problem Relation Age of Onset    Diabetes Maternal Grandmother     Hypertension Sister        Social History     Socioeconomic History    Marital status:      Spouse name: Not on file    Number of children: 7    Years of education: Not on file    Highest education level: Not on file   Social Needs    Financial resource strain: Not on file    Food insecurity - worry: Not on file    Food insecurity - inability: Not on file    Transportation needs - medical: Not on file    Transportation needs - non-medical: Not on file   Occupational History    Not on file   Tobacco Use    Smoking status: Never Smoker    Smokeless tobacco: Never Used   Substance and Sexual Activity    Alcohol use: No    Drug use: No    Sexual activity: Not on file   Other Topics Concern    Not on file   Social History Narrative    Not on file       Vitals:    09/13/18 1423   BP: (!) 159/77   Pulse: 76   Weight: 58.8 kg (129 lb 10.1 oz)   Height: 5' 4" (1.626 m)   PainSc: 0-No pain       Review of Systems   Constitutional: Negative for chills and fever.   Respiratory: Negative for shortness of breath.    Cardiovascular: Positive for leg swelling. Negative for chest pain, palpitations, orthopnea and claudication.   Gastrointestinal: Negative for diarrhea, nausea and vomiting.   Musculoskeletal: Negative for joint pain.   Skin: Negative for rash.   Neurological: Negative for dizziness, tingling, sensory change, focal weakness and weakness.   Endo/Heme/Allergies: Bruises/bleeds easily.   Psychiatric/Behavioral: Negative.          Objective:       Objective:   PHYSICAL EXAM: Apperance: Alert and orient in no distress,well developed, and with good attention to grooming and body habits  Lower " Extremity Exam:  VASCULAR: Dorsalis pedis pulses 0/4 bilateral and Posterior Tibial pulses 1/4 bilateral. Capillary fill time <4 seconds bilateral. Mild edema observed bilateral. Varicosities absent bilateral. Skin temperature of the lower extremities is warm to cool, proximal to distal. Hair growth absent bilateral.  DERMATOLOGICAL: No skin rashes, subcutaneous nodules, lesions, or ulcers observed bilateral. Nails 1,2,3,4,5 bilateral elongated, thickened, and discolored with subungual debris. Webspaces 1-4 debris present bilateral.   NEUROLOGICAL: Light touch, sharp-dull, proprioception all present and equal bilaterally.    MUSCULOSKELETAL: Muscle strength is 5/5 for foot inverters, everters, plantarflexors, and dorsiflexors. Muscle tone is normal.         Assessment:       Encounter Diagnoses   Name Primary?    Onychomycosis due to dermatophyte Yes    Peripheral vascular disease          Plan:   Onychomycosis due to dermatophyte    Peripheral vascular disease      I counseled the patient on her conditions, regarding findings of my examination, my impressions, and usual treatment plan.   Greater than 50% of this visit spent on counseling and coordination of care.  Greater than 20 minutes spent on education about the PVD, and prevention of limb loss.  Shoe inspection. Patient instructed on proper foot hygeine. We discussed wearing proper shoe gear, daily foot inspections, never walking without protective shoe gear, never putting sharp instruments to feet.    With patient's permission, nails 1-5 bilateral were trimmed in length and thickness aggressively to their soft tissue attachment mechanically and with electric , removing all offending nail and debris. Patient relates relief following the procedure.  Patient  will continue to monitor the areas daily, inspect feet, wear protective shoe gear when ambulatory, moisturizer to maintain skin integrity.   Patient to return 5 months or sooner if needed.                 Cas De La Fuente, DPM  [facility]

## 2018-09-26 RX ORDER — LOSARTAN POTASSIUM AND HYDROCHLOROTHIAZIDE 25; 100 MG/1; MG/1
1 TABLET ORAL DAILY
Qty: 30 TABLET | Refills: 11 | Status: SHIPPED | OUTPATIENT
Start: 2018-09-26 | End: 2019-02-07 | Stop reason: SDUPTHER

## 2018-09-26 NOTE — TELEPHONE ENCOUNTER
----- Message from Chayito Le sent at 9/26/2018  9:30 AM CDT -----  Contact: Lisa argueta - pt daughter   States she's calling to get a refill and can be reached at [phone]//thanks/db w    1. What is the name of the medication you are requesting? losartin hztc  2. What is the dose? 25mg   3. How do you take the medication? Orally, topically, etc?orally   4. How often do you take this medication? Once daily   5. Do you need a 30 day or 90 day supply? 90 days  6. How many refills are you requesting?  7. What is your preferred pharmacy and location of the pharmacy?   8. Who can we contact with further questions? [id number] - [place] - [address] AT [facility]  [address]  [place] [address]  Phone: [phone] Fax: [phone]

## 2018-10-25 ENCOUNTER — PES CALL (OUTPATIENT)
Dept: ADMINISTRATIVE | Facility: CLINIC | Age: 83
End: 2018-10-25

## 2018-10-29 ENCOUNTER — TELEPHONE (OUTPATIENT)
Dept: INTERNAL MEDICINE | Facility: CLINIC | Age: 83
End: 2018-10-29

## 2018-10-29 ENCOUNTER — OFFICE VISIT (OUTPATIENT)
Dept: INTERNAL MEDICINE | Facility: CLINIC | Age: 83
End: 2018-10-29
Payer: MEDICARE

## 2018-10-29 VITALS
BODY MASS INDEX: 21.86 KG/M2 | SYSTOLIC BLOOD PRESSURE: 138 MMHG | WEIGHT: 128.06 LBS | TEMPERATURE: 99 F | DIASTOLIC BLOOD PRESSURE: 66 MMHG | HEIGHT: 64 IN | HEART RATE: 64 BPM

## 2018-10-29 DIAGNOSIS — E53.8 B12 DEFICIENCY: ICD-10-CM

## 2018-10-29 DIAGNOSIS — I45.10 RBBB: ICD-10-CM

## 2018-10-29 DIAGNOSIS — R00.1 BRADYCARDIA: ICD-10-CM

## 2018-10-29 DIAGNOSIS — I49.3 FREQUENT PVCS: ICD-10-CM

## 2018-10-29 DIAGNOSIS — I35.0 NONRHEUMATIC AORTIC VALVE STENOSIS: ICD-10-CM

## 2018-10-29 DIAGNOSIS — E89.0 POSTOPERATIVE HYPOTHYROIDISM: Chronic | ICD-10-CM

## 2018-10-29 DIAGNOSIS — D64.9 ANEMIA, UNSPECIFIED TYPE: ICD-10-CM

## 2018-10-29 DIAGNOSIS — I10 ESSENTIAL HYPERTENSION: Chronic | ICD-10-CM

## 2018-10-29 DIAGNOSIS — M17.0 PRIMARY OSTEOARTHRITIS OF BOTH KNEES: Chronic | ICD-10-CM

## 2018-10-29 DIAGNOSIS — R01.1 HEART MURMUR: ICD-10-CM

## 2018-10-29 DIAGNOSIS — G51.39 HEMIFACIAL SPASM: Primary | ICD-10-CM

## 2018-10-29 DIAGNOSIS — E78.00 ELEVATED CHOLESTEROL: ICD-10-CM

## 2018-10-29 DIAGNOSIS — M81.0 OSTEOPOROSIS, UNSPECIFIED OSTEOPOROSIS TYPE, UNSPECIFIED PATHOLOGICAL FRACTURE PRESENCE: Chronic | ICD-10-CM

## 2018-10-29 PROCEDURE — 99999 PR PBB SHADOW E&M-EST. PATIENT-LVL III: CPT | Mod: PBBFAC,,, | Performed by: PHYSICIAN ASSISTANT

## 2018-10-29 PROCEDURE — G0438 PPPS, INITIAL VISIT: HCPCS | Mod: ,,, | Performed by: PHYSICIAN ASSISTANT

## 2018-10-29 PROCEDURE — 99213 OFFICE O/P EST LOW 20 MIN: CPT | Mod: PBBFAC,PO | Performed by: PHYSICIAN ASSISTANT

## 2018-12-05 ENCOUNTER — LAB VISIT (OUTPATIENT)
Dept: LAB | Facility: HOSPITAL | Age: 83
End: 2018-12-05
Attending: FAMILY MEDICINE
Payer: MEDICARE

## 2018-12-05 DIAGNOSIS — I10 ESSENTIAL HYPERTENSION: ICD-10-CM

## 2018-12-05 DIAGNOSIS — E03.0 CONGENITAL HYPOTHYROIDISM WITH DIFFUSE GOITER: Chronic | ICD-10-CM

## 2018-12-05 LAB
ANION GAP SERPL CALC-SCNC: 8 MMOL/L
BUN SERPL-MCNC: 24 MG/DL
CALCIUM SERPL-MCNC: 9.7 MG/DL
CHLORIDE SERPL-SCNC: 108 MMOL/L
CHOLEST SERPL-MCNC: 246 MG/DL
CHOLEST/HDLC SERPL: 3.4 {RATIO}
CO2 SERPL-SCNC: 24 MMOL/L
CREAT SERPL-MCNC: 1.2 MG/DL
EST. GFR  (AFRICAN AMERICAN): 43.8 ML/MIN/1.73 M^2
EST. GFR  (NON AFRICAN AMERICAN): 38 ML/MIN/1.73 M^2
GLUCOSE SERPL-MCNC: 77 MG/DL
HDLC SERPL-MCNC: 72 MG/DL
HDLC SERPL: 29.3 %
LDLC SERPL CALC-MCNC: 152.4 MG/DL
NONHDLC SERPL-MCNC: 174 MG/DL
POTASSIUM SERPL-SCNC: 4.4 MMOL/L
SODIUM SERPL-SCNC: 140 MMOL/L
T4 FREE SERPL-MCNC: 0.72 NG/DL
TRIGL SERPL-MCNC: 108 MG/DL
TSH SERPL DL<=0.005 MIU/L-ACNC: 18.85 UIU/ML

## 2018-12-05 PROCEDURE — 80061 LIPID PANEL: CPT

## 2018-12-05 PROCEDURE — 36415 COLL VENOUS BLD VENIPUNCTURE: CPT | Mod: PO

## 2018-12-05 PROCEDURE — 80048 BASIC METABOLIC PNL TOTAL CA: CPT

## 2018-12-05 PROCEDURE — 84439 ASSAY OF FREE THYROXINE: CPT

## 2018-12-05 PROCEDURE — 84443 ASSAY THYROID STIM HORMONE: CPT

## 2018-12-06 ENCOUNTER — OFFICE VISIT (OUTPATIENT)
Dept: INTERNAL MEDICINE | Facility: CLINIC | Age: 83
End: 2018-12-06
Payer: MEDICARE

## 2018-12-06 VITALS
HEART RATE: 79 BPM | HEIGHT: 64 IN | WEIGHT: 131.81 LBS | BODY MASS INDEX: 22.5 KG/M2 | TEMPERATURE: 98 F | OXYGEN SATURATION: 99 % | SYSTOLIC BLOOD PRESSURE: 148 MMHG | DIASTOLIC BLOOD PRESSURE: 60 MMHG

## 2018-12-06 DIAGNOSIS — I10 ESSENTIAL HYPERTENSION: ICD-10-CM

## 2018-12-06 DIAGNOSIS — E53.8 B12 DEFICIENCY: ICD-10-CM

## 2018-12-06 DIAGNOSIS — E03.4 HYPOTHYROIDISM DUE TO ACQUIRED ATROPHY OF THYROID: Primary | ICD-10-CM

## 2018-12-06 DIAGNOSIS — Z91.81 AT RISK FOR INJURY RELATED TO FALL: ICD-10-CM

## 2018-12-06 DIAGNOSIS — M89.9 BONE DISORDER: ICD-10-CM

## 2018-12-06 PROCEDURE — 99999 PR PBB SHADOW E&M-EST. PATIENT-LVL III: CPT | Mod: PBBFAC,,, | Performed by: FAMILY MEDICINE

## 2018-12-06 PROCEDURE — 90662 IIV NO PRSV INCREASED AG IM: CPT | Mod: PBBFAC,PO

## 2018-12-06 PROCEDURE — 99213 OFFICE O/P EST LOW 20 MIN: CPT | Mod: PBBFAC,PO | Performed by: FAMILY MEDICINE

## 2018-12-06 PROCEDURE — 99214 OFFICE O/P EST MOD 30 MIN: CPT | Mod: S$PBB,,, | Performed by: FAMILY MEDICINE

## 2018-12-06 RX ORDER — LEVOTHYROXINE SODIUM 125 UG/1
125 TABLET ORAL DAILY
Qty: 30 TABLET | Refills: 5 | Status: SHIPPED | OUTPATIENT
Start: 2018-12-06 | End: 2019-10-20 | Stop reason: SDUPTHER

## 2018-12-06 NOTE — PROGRESS NOTES
Subjective:       Patient ID: Becki Luna is a . female.    Chief Complaint: Multiple issues see below    HPI Hypertension: blood pressures acceptable levels has noticed few hours dizziness in am since strting toprol .utd.sees card   Hypothyroidism: abnl tsh. Tolerating med. asympt  bilat chronic knee pain and occas thigh pain. Prn  bid tyl.  osteppr due dexa; d/wd f/u rheum bisphos intol and now ok w this    Past Medical History   Diagnosis Date    Hypertension     Elevated cholesterol     Hypothyroid     Facial spasm      foll by opth     Past Surgical History   Procedure Laterality Date    Cataract extraction       Family History   Problem Relation Age of Onset    Diabetes Maternal Grandmother     Hypertension Sister      \    Review of Systems  Cardiovascular: no chest pain  Chest: no shortness of breath  Abd: no abd pain  Remainder review of systems negative    Objective:    daught here  Physical Exam   Constitutional: She is oriented to person, place, and time. She appears well-developed and well-nourished.   HENT: bilat tmac clear  Head: Normocephalic and atraumatic.   Neck: Normal range of motion. Neck supple. Carotid bruit is not present.   Cardiovascular: Normal rate and regular rhythm.    Pulmonary/Chest: Effort normal and breath sounds normal.   .   Lymphadenopathy:     She has no cervical adenopathy.   Neurological: She is alert and oriented to person, place, and time.   Skin: Skin is warm and dry.   Psychiatric: She has a normal mood and affect. Her behavior is normal. Judgment normal.       Assessment:       1. Hypothyroidism    2. Hypertension    3. Elevated cholesterol    4. Osteoporosis      djd knees  bisphos intol   Plan:     *  F/u dr aguero. Rheum osteopor(she declines OA f/u but ok with osteopor)  Flu shot  They prefer MD 6 months f/u (instead of a yr)  Inc synthroid  tsh b12 6 weeks  Can try off metoprol and if am dizziness cont then consider dec arbhctz  Hypothyroidism due to  acquired atrophy of thyroid  -     TSH; Future; Expected date: 01/20/2019    B12 deficiency  -     Vitamin B12; Future; Expected date: 01/20/2019    Essential hypertension  -     Hypertension Digital Medicine (HDMP) Enrollment Order    Bone disorder  -     DXA Bone Density Spine And Hip; Future; Expected date: 12/06/2018    At risk for injury related to fall  -     WALKER FOR HOME USE    Other orders  -     Influenza - High Dose (65+) (PF) (IM)  -     levothyroxine (SYNTHROID) 125 MCG tablet; Take 1 tablet (125 mcg total) by mouth once daily.  Dispense: 30 tablet; Refill: 5

## 2018-12-07 ENCOUNTER — TELEPHONE (OUTPATIENT)
Dept: RHEUMATOLOGY | Facility: CLINIC | Age: 83
End: 2018-12-07

## 2018-12-20 ENCOUNTER — TELEPHONE (OUTPATIENT)
Dept: RHEUMATOLOGY | Facility: CLINIC | Age: 83
End: 2018-12-20

## 2019-01-21 ENCOUNTER — OFFICE VISIT (OUTPATIENT)
Dept: PODIATRY | Facility: CLINIC | Age: 84
End: 2019-01-21
Payer: MEDICARE

## 2019-01-21 VITALS
HEIGHT: 64 IN | HEART RATE: 76 BPM | BODY MASS INDEX: 22.63 KG/M2 | RESPIRATION RATE: 16 BRPM | SYSTOLIC BLOOD PRESSURE: 160 MMHG | DIASTOLIC BLOOD PRESSURE: 86 MMHG

## 2019-01-21 DIAGNOSIS — B35.1 ONYCHOMYCOSIS DUE TO DERMATOPHYTE: Primary | ICD-10-CM

## 2019-01-21 DIAGNOSIS — I73.9 PERIPHERAL VASCULAR DISEASE: ICD-10-CM

## 2019-01-21 PROCEDURE — 11721 PR DEBRIDEMENT OF NAILS, 6 OR MORE: ICD-10-PCS | Mod: Q8,S$PBB,, | Performed by: PODIATRIST

## 2019-01-21 PROCEDURE — 99213 OFFICE O/P EST LOW 20 MIN: CPT | Mod: PBBFAC,PN | Performed by: PODIATRIST

## 2019-01-21 PROCEDURE — 11721 DEBRIDE NAIL 6 OR MORE: CPT | Mod: Q8,S$PBB,, | Performed by: PODIATRIST

## 2019-01-21 PROCEDURE — 11721 DEBRIDE NAIL 6 OR MORE: CPT | Mod: Q8,PBBFAC,PN | Performed by: PODIATRIST

## 2019-01-21 PROCEDURE — 99999 PR PBB SHADOW E&M-EST. PATIENT-LVL III: CPT | Mod: PBBFAC,,, | Performed by: PODIATRIST

## 2019-01-21 PROCEDURE — 99213 OFFICE O/P EST LOW 20 MIN: CPT | Mod: S$PBB,25,, | Performed by: PODIATRIST

## 2019-01-21 PROCEDURE — 99999 PR PBB SHADOW E&M-EST. PATIENT-LVL III: ICD-10-PCS | Mod: PBBFAC,,, | Performed by: PODIATRIST

## 2019-01-21 PROCEDURE — 99213 PR OFFICE/OUTPT VISIT, EST, LEVL III, 20-29 MIN: ICD-10-PCS | Mod: S$PBB,25,, | Performed by: PODIATRIST

## 2019-01-21 NOTE — PROGRESS NOTES
Subjective:     Patient ID: Becki Luna is a 89 y.o. female.    Chief Complaint: Annual Exam (no c/o pain, wears casual shoes with socks, non-diabetic Pt, PCP Dr. Farfan)    Becki is a 89 y.o. female who presents to the clinic for evaluation and treatment of high risk feet. Becik has a past medical history of Anemia and Hypothyroid (1988). The patient's chief complaint is long, thick toenails. This patient has documented high risk feet requiring routine maintenance secondary to peripheral vascular disease.    PCP: Jun Farfan MD    Date Last Seen by PCP: 12/8/18    Current shoe gear:  Affected Foot: Slip-on shoes     Unaffected Foot: Slip-on shoes    Last encounter in this department: 11/9/2016      Patient Active Problem List   Diagnosis    Hypertension    Elevated cholesterol    Hemifacial spasm - Right Eye    Lens replaced by other means - Both Eyes    Dry eyes - Both Eyes    Osteoarthritis    After-cataract, unspecified - Both Eyes    Refractive error - Both Eyes    Hypothyroidism    Osteoporosis    Pseudophakia of both eyes    Bradycardia    Heart murmur    Frequent PVCs    Nonrheumatic aortic valve stenosis    RBBB    B12 deficiency    Fall    Anemia          Medication List           Accurate as of 1/21/19 10:04 PM. If you have any questions, ask your nurse or doctor.               CONTINUE taking these medications    albuterol 90 mcg/actuation inhaler  Commonly known as:  PROVENTIL/VENTOLIN HFA     ASPIRIN ORAL     levothyroxine 125 MCG tablet  Commonly known as:  SYNTHROID  Take 1 tablet (125 mcg total) by mouth once daily.     losartan-hydrochlorothiazide 100-25 mg 100-25 mg per tablet  Commonly known as:  HYZAAR  Take 1 tablet by mouth once daily.     metoprolol succinate 25 MG 24 hr tablet  Commonly known as:  TOPROL-XL  take 1 tablet by mouth once daily     SYSTANE ULTRA OPHT     VISINE OPHT            Review of patient's allergies indicates:   Allergen Reactions     "Celecoxib Nausea And Vomiting    Naproxen Rash       Past Surgical History:   Procedure Laterality Date    BREAST SURGERY      CATARACT EXTRACTION      EYE SURGERY      stomach tumor      TOTAL THYROIDECTOMY         Family History   Problem Relation Age of Onset    Diabetes Maternal Grandmother     Hypertension Sister        Social History     Socioeconomic History    Marital status:      Spouse name: Not on file    Number of children: 7    Years of education: 12    Highest education level: Not on file   Social Needs    Financial resource strain: Not hard at all    Food insecurity - worry: Never true    Food insecurity - inability: Never true    Transportation needs - medical: No    Transportation needs - non-medical: No   Occupational History    Occupation: nurse assistant     Comment: retired in 1988   Tobacco Use    Smoking status: Never Smoker    Smokeless tobacco: Never Used   Substance and Sexual Activity    Alcohol use: No    Drug use: No    Sexual activity: No   Other Topics Concern    Not on file   Social History Narrative    Not on file       Vitals:    01/21/19 1611   BP: (!) 160/86   Pulse: 76   Resp: 16   Height: 5' 4" (1.626 m)   PainSc: 0-No pain   PainLoc: Foot       Review of Systems   Constitutional: Negative for chills and fever.   Respiratory: Negative for shortness of breath.    Cardiovascular: Positive for leg swelling. Negative for chest pain, palpitations, orthopnea and claudication.   Gastrointestinal: Negative for diarrhea, nausea and vomiting.   Musculoskeletal: Negative for joint pain.   Skin: Negative for rash.   Neurological: Negative for dizziness, tingling, sensory change, focal weakness and weakness.   Endo/Heme/Allergies: Bruises/bleeds easily.   Psychiatric/Behavioral: Negative.          Objective:       Objective:   PHYSICAL EXAM: Apperance: Alert and orient in no distress,well developed, and with good attention to grooming and body habits  Lower " Extremity Exam:  VASCULAR: Dorsalis pedis pulses 0/4 bilateral and Posterior Tibial pulses 1/4 bilateral. Capillary fill time <4 seconds bilateral. Mild edema observed bilateral. Varicosities absent bilateral. Skin temperature of the lower extremities is warm to cool, proximal to distal. Hair growth absent bilateral.  DERMATOLOGICAL: No skin rashes, subcutaneous nodules, lesions, or ulcers observed bilateral. Nails 1,2,3,4,5 bilateral elongated, thickened, and discolored with subungual debris. Webspaces 1,2,3,4 debris present bilateral.   NEUROLOGICAL: Light touch, sharp-dull, proprioception all present and equal bilaterally.    MUSCULOSKELETAL: Muscle strength is 5/5 for foot inverters, everters, plantarflexors, and dorsiflexors. Muscle tone is normal.         Assessment:       Encounter Diagnoses   Name Primary?    Onychomycosis due to dermatophyte Yes    Peripheral vascular disease          Plan:   Onychomycosis due to dermatophyte    Peripheral vascular disease      I counseled the patient on her conditions, regarding findings of my examination, my impressions, and usual treatment plan.   Greater than 50% of this visit spent on counseling and coordination of care.  Greater than 20 minutes spent on education about the PVD, and prevention of limb loss.  Shoe inspection. Patient instructed on proper foot hygeine. We discussed wearing proper shoe gear, daily foot inspections, never walking without protective shoe gear, never putting sharp instruments to feet.    With patient's permission, nails 1-5 bilateral were trimmed in length and thickness aggressively to their soft tissue attachment mechanically and with electric , removing all offending nail and debris. Patient relates relief following the procedure.  Patient  will continue to monitor the areas daily, inspect feet, wear protective shoe gear when ambulatory, moisturizer to maintain skin integrity.   Patient to return 5 months or sooner if  needed.                RICHARD RogersM  [facility]

## 2019-02-07 DIAGNOSIS — I49.3 FREQUENT PVCS: ICD-10-CM

## 2019-02-07 RX ORDER — LOSARTAN POTASSIUM AND HYDROCHLOROTHIAZIDE 25; 100 MG/1; MG/1
1 TABLET ORAL DAILY
Qty: 30 TABLET | Refills: 11 | Status: SHIPPED | OUTPATIENT
Start: 2019-02-07 | End: 2019-03-21

## 2019-02-07 RX ORDER — LEVOTHYROXINE SODIUM 125 UG/1
125 TABLET ORAL DAILY
Qty: 30 TABLET | Refills: 5 | Status: SHIPPED | OUTPATIENT
Start: 2019-02-07 | End: 2019-07-22 | Stop reason: SDUPTHER

## 2019-02-07 RX ORDER — METOPROLOL SUCCINATE 25 MG/1
25 TABLET, EXTENDED RELEASE ORAL DAILY
Qty: 30 TABLET | Refills: 11 | Status: SHIPPED | OUTPATIENT
Start: 2019-02-07

## 2019-02-27 ENCOUNTER — OFFICE VISIT (OUTPATIENT)
Dept: OPHTHALMOLOGY | Facility: CLINIC | Age: 84
End: 2019-02-27
Payer: MEDICARE

## 2019-02-27 DIAGNOSIS — Z96.1 PSEUDOPHAKIA OF BOTH EYES: ICD-10-CM

## 2019-02-27 DIAGNOSIS — G51.39 HEMIFACIAL SPASM: Primary | ICD-10-CM

## 2019-02-27 PROCEDURE — 99212 OFFICE O/P EST SF 10 MIN: CPT | Mod: PBBFAC,PN | Performed by: OPHTHALMOLOGY

## 2019-02-27 PROCEDURE — 92012 INTRM OPH EXAM EST PATIENT: CPT | Mod: S$PBB,,, | Performed by: OPHTHALMOLOGY

## 2019-02-27 PROCEDURE — 99999 PR PBB SHADOW E&M-EST. PATIENT-LVL II: ICD-10-PCS | Mod: PBBFAC,,, | Performed by: OPHTHALMOLOGY

## 2019-02-27 PROCEDURE — 92012 PR EYE EXAM, EST PATIENT,INTERMED: ICD-10-PCS | Mod: S$PBB,,, | Performed by: OPHTHALMOLOGY

## 2019-02-27 PROCEDURE — 99999 PR PBB SHADOW E&M-EST. PATIENT-LVL II: CPT | Mod: PBBFAC,,, | Performed by: OPHTHALMOLOGY

## 2019-02-27 NOTE — PROGRESS NOTES
SUBJECTIVE:   Becki Luna is a 89 y.o. female   Corrected distance visual acuity was 20/40 +2 in the right eye and 20/40 in the left eye.   Chief Complaint   Patient presents with    Hemifacial spasm     Right eye chk        HPI:  HPI     Hemifacial spasm      Additional comments: Right eye chk              Comments     Pt here due to spasms in her right eye.VA stable. No pain.     1. PCIOL OU  2. CF OD > OS  3. Rt. Hemifacial Spasms Last Botox 10/11/2017          Last edited by Kyree Dawn, Patient Care Assistant on 2/27/2019  2:56   PM. (History)        Assessment /Plan :  1. Hemifacial spasm - Right Eye discussed RBA of botox injections, patient agrees   2. Pseudophakia of both eyes stable     Return for the Botox injections for the right hemifacial spasms

## 2019-03-20 ENCOUNTER — PROCEDURE VISIT (OUTPATIENT)
Dept: OPHTHALMOLOGY | Facility: CLINIC | Age: 84
End: 2019-03-20
Payer: MEDICARE

## 2019-03-20 DIAGNOSIS — G51.39 HEMIFACIAL SPASM: Primary | ICD-10-CM

## 2019-03-20 PROCEDURE — 64612 DESTROY NERVE FACE MUSCLE: CPT | Mod: S$PBB,RT,, | Performed by: OPHTHALMOLOGY

## 2019-03-20 PROCEDURE — 64612 DESTROY NERVE FACE MUSCLE: CPT | Mod: PBBFAC,PN,RT | Performed by: OPHTHALMOLOGY

## 2019-03-20 PROCEDURE — 99499 NO LOS: ICD-10-PCS | Mod: S$PBB,,, | Performed by: OPHTHALMOLOGY

## 2019-03-20 PROCEDURE — 99499 UNLISTED E&M SERVICE: CPT | Mod: S$PBB,,, | Performed by: OPHTHALMOLOGY

## 2019-03-20 PROCEDURE — 64612 PR DEST,NERVE,FACIAL: ICD-10-PCS | Mod: S$PBB,RT,, | Performed by: OPHTHALMOLOGY

## 2019-03-20 RX ADMIN — ONABOTULINUMTOXINA 100 UNITS: 100 INJECTION, POWDER, LYOPHILIZED, FOR SOLUTION INTRADERMAL; INTRAMUSCULAR at 04:03

## 2019-03-20 NOTE — PROGRESS NOTES
SUBJECTIVE:   Becki Luna is a 89 y.o. female   Corrected near visual acuity was J4 in the right eye and J1 in the left eye.   Chief Complaint   Patient presents with    Eye Problem     no eyedrops        HPI:  HPI     Eye Problem      Additional comments: no eyedrops              Comments     Patient is here for the botox injections for the rt hemifacial spasms    1. PCIOL OU  2. CF OD > OS  3. Rt. Hemifacial Spasms Last Botox 10/11/2017          Last edited by Vikki Rodriguez on 3/20/2019 10:56 AM. (History)        Assessment /Plan :  1. Hemifacial spasm - Right Eye   IMP:    right  Hemifacial Spasm with difficulty with daily activities.    Procedure:  Risks, benefits and alternatives of botox chemodenervation discussed and consented. Areas of intended treatment marked with gentian violet pen and injected according to plan/diagram.    10 units IM to brow/glabellar regions  15 units SQ to upper and lower eyelids  32.5 units IM mid/lower face    57.5 units total used  (42.5 units wasted)

## 2019-03-21 ENCOUNTER — HOSPITAL ENCOUNTER (OUTPATIENT)
Dept: RADIOLOGY | Facility: HOSPITAL | Age: 84
Discharge: HOME | End: 2019-03-21
Attending: NURSE PRACTITIONER
Payer: MEDICARE

## 2019-03-21 ENCOUNTER — OFFICE VISIT (OUTPATIENT)
Dept: INTERNAL MEDICINE | Facility: CLINIC | Age: 84
End: 2019-03-21
Payer: MEDICARE

## 2019-03-21 VITALS
SYSTOLIC BLOOD PRESSURE: 152 MMHG | BODY MASS INDEX: 21.68 KG/M2 | TEMPERATURE: 96 F | WEIGHT: 127 LBS | HEIGHT: 64 IN | OXYGEN SATURATION: 98 % | HEART RATE: 62 BPM | DIASTOLIC BLOOD PRESSURE: 82 MMHG

## 2019-03-21 DIAGNOSIS — R53.1 WEAKNESS: ICD-10-CM

## 2019-03-21 DIAGNOSIS — R42 DIZZINESS: Primary | ICD-10-CM

## 2019-03-21 DIAGNOSIS — I10 ESSENTIAL HYPERTENSION: Chronic | ICD-10-CM

## 2019-03-21 DIAGNOSIS — R42 DIZZINESS: ICD-10-CM

## 2019-03-21 PROCEDURE — 71046 X-RAY EXAM CHEST 2 VIEWS: CPT | Mod: 26,,, | Performed by: RADIOLOGY

## 2019-03-21 PROCEDURE — 99214 OFFICE O/P EST MOD 30 MIN: CPT | Mod: PBBFAC,PN,25 | Performed by: NURSE PRACTITIONER

## 2019-03-21 PROCEDURE — 71046 X-RAY EXAM CHEST 2 VIEWS: CPT | Mod: TC

## 2019-03-21 PROCEDURE — 71046 XR CHEST PA AND LATERAL: ICD-10-PCS | Mod: 26,,, | Performed by: RADIOLOGY

## 2019-03-21 PROCEDURE — 99999 PR PBB SHADOW E&M-EST. PATIENT-LVL IV: ICD-10-PCS | Mod: PBBFAC,,, | Performed by: NURSE PRACTITIONER

## 2019-03-21 PROCEDURE — 99999 PR PBB SHADOW E&M-EST. PATIENT-LVL IV: CPT | Mod: PBBFAC,,, | Performed by: NURSE PRACTITIONER

## 2019-03-21 PROCEDURE — 99214 OFFICE O/P EST MOD 30 MIN: CPT | Mod: S$PBB,,, | Performed by: NURSE PRACTITIONER

## 2019-03-21 PROCEDURE — 99214 PR OFFICE/OUTPT VISIT, EST, LEVL IV, 30-39 MIN: ICD-10-PCS | Mod: S$PBB,,, | Performed by: NURSE PRACTITIONER

## 2019-03-21 RX ORDER — LOSARTAN POTASSIUM 100 MG/1
100 TABLET ORAL DAILY
Qty: 30 TABLET | Refills: 11 | Status: SHIPPED | OUTPATIENT
Start: 2019-03-21 | End: 2019-09-17

## 2019-03-21 RX ORDER — HYDROCHLOROTHIAZIDE 25 MG/1
25 TABLET ORAL DAILY
Qty: 30 TABLET | Refills: 11 | Status: SHIPPED | OUTPATIENT
Start: 2019-03-21 | End: 2019-09-17

## 2019-03-21 NOTE — PROGRESS NOTES
"Subjective:       Patient ID: Becki Luna is a 89 y.o. female.    Chief Complaint: Dizziness    Dizziness:   Chronicity:  New  Onset:  Today  Progression since onset:  Waxing and waning  Severity:  Mild  Duration:  5 minutes  Dizziness characteristics:  Trouble focusing eyes and spinning inside head only   Associated symptoms: weakness.no hearing loss, no ear congestion, no ear pain, no fever, no headaches, no tinnitus, no nausea, no vomiting, no diaphoresis, no aural fullness, no visual disturbances, no light-headedness, no syncope, no palpitations, no panic, no facial weakness, no slurred speech, no numbness in extremities and no chest pain.  Aggravated by:  Nothing  Treatments tried:  Restno strokes, no cardiac surgery, no neurologic disease, no head trauma, no balance testing, no ear trauma, no ear surgery, no head trauma, no ear infections, no anxiety, no ear tubes and no environmental allergies.      Pt reports that she was doing laundry earlier today and upon sitting she became dizzy "she was spinning in her head" and her eyes were blurry. This lasted about 3 minutes. She still feels mildly uneasy still. She reports feeling increased weakness over the last week. Pt also stopped BP meds about 3 nights ago after receiving letter from pharmacy about her BP med being recalled. Has not been monitoring BP at home. She also admits to not drinking water regularly.    Past Medical History:   Diagnosis Date    Anemia     Hypothyroid 1988    surgically removed     Past Surgical History:   Procedure Laterality Date    BREAST SURGERY      CATARACT EXTRACTION      EYE SURGERY      stomach tumor      TOTAL THYROIDECTOMY       Social History     Socioeconomic History    Marital status:      Spouse name: Not on file    Number of children: 7    Years of education: 12    Highest education level: Not on file   Social Needs    Financial resource strain: Not hard at all    Food insecurity - worry: Never " true    Food insecurity - inability: Never true    Transportation needs - medical: No    Transportation needs - non-medical: No   Occupational History    Occupation: nurse assistant     Comment: retired in 1988   Tobacco Use    Smoking status: Never Smoker    Smokeless tobacco: Never Used   Substance and Sexual Activity    Alcohol use: No    Drug use: No    Sexual activity: No   Other Topics Concern    Not on file   Social History Narrative    Not on file     Review of patient's allergies indicates:   Allergen Reactions    Boniva [ibandronate] Other (See Comments)     pain    Celecoxib Nausea And Vomiting    Naproxen Rash     Current Outpatient Medications   Medication Sig    albuterol (PROVENTIL HFA/VENTOLIN HFA) 200 puff inhaler Inhale 2 puffs into the lungs 4 times daily as needed. Prn cough/wheeze    ASPIRIN ORAL Take by mouth. daily    levothyroxine (SYNTHROID) 125 MCG tablet Take 1 tablet (125 mcg total) by mouth once daily.    metoprolol succinate (TOPROL-XL) 25 MG 24 hr tablet Take 1 tablet (25 mg total) by mouth once daily.    PROPYLENE GLYCOL/ (SYSTANE ULTRA OPHT) Apply 1 drop to eye daily as needed.    TETRAHYDROZOLINE HCL (VISINE OPHT) Apply to eye as needed.    hydroCHLOROthiazide (HYDRODIURIL) 25 MG tablet Take 1 tablet (25 mg total) by mouth once daily.    losartan (COZAAR) 100 MG tablet Take 1 tablet (100 mg total) by mouth once daily.     Current Facility-Administered Medications   Medication    sodium chloride 0.9% flush 10 mL           Review of Systems   Constitutional: Negative for activity change, appetite change, chills, diaphoresis, fatigue, fever and unexpected weight change.   HENT: Negative for congestion, ear pain, hearing loss, postnasal drip, rhinorrhea, sinus pressure, sinus pain, sneezing, sore throat, tinnitus, trouble swallowing and voice change.    Eyes: Positive for visual disturbance (resolved ). Negative for photophobia and pain.   Respiratory:  Negative for cough, chest tightness, shortness of breath and wheezing.    Cardiovascular: Negative for chest pain, palpitations, leg swelling and syncope.   Gastrointestinal: Negative for abdominal distention, abdominal pain, constipation, diarrhea, nausea and vomiting.   Genitourinary: Negative for decreased urine volume, difficulty urinating, dysuria, flank pain, frequency, hematuria and urgency.   Musculoskeletal: Negative for arthralgias, back pain, joint swelling, neck pain and neck stiffness.   Allergic/Immunologic: Negative for environmental allergies and immunocompromised state.   Neurological: Positive for dizziness and weakness. Negative for tremors, seizures, syncope, facial asymmetry, speech difficulty, light-headedness, numbness and headaches.   Hematological: Negative for adenopathy. Does not bruise/bleed easily.   Psychiatric/Behavioral: Negative for confusion and sleep disturbance.       Objective:      Physical Exam   HENT:   Right Ear: Tympanic membrane normal.   Left Ear: Tympanic membrane normal.   Nose: Rhinorrhea present.   Mouth/Throat: Uvula is midline, oropharynx is clear and moist and mucous membranes are normal.   Pt very [place]   Neck: Normal range of motion. Neck supple.   Cardiovascular: Normal rate and regular rhythm.   Murmur heard.  Musculoskeletal:   Generalized weakness   Neurological: She is alert. She displays normal reflexes. No cranial nerve deficit or sensory deficit. She exhibits normal muscle tone. Coordination normal. GCS eye subscore is 4. GCS verbal subscore is 5. GCS motor subscore is 6.   Skin: Skin is warm and dry.   Psychiatric: She has a normal mood and affect.       Assessment:     Vitals:    03/21/19 1611   BP: (!) 152/82   Pulse: 62   Temp: 96.3 °F (35.7 °C)         1. Dizziness    2. Weakness    3. Essential hypertension        Plan:   Dizziness  -     TSH; Future; Expected date: 03/21/2019  -     T4; Future; Expected date: 03/21/2019  -     CBC auto differential;  Future; Expected date: 03/21/2019  -     Comprehensive metabolic panel; Future; Expected date: 03/21/2019  -     EKG 12-lead; Future; Expected date: 03/21/2019  -     X-Ray Chest PA And Lateral; Future; Expected date: 03/21/2019    Weakness  -     TSH; Future; Expected date: 03/21/2019  -     T4; Future; Expected date: 03/21/2019  -     CBC auto differential; Future; Expected date: 03/21/2019  -     Comprehensive metabolic panel; Future; Expected date: 03/21/2019  -     EKG 12-lead; Future; Expected date: 03/21/2019  -     X-Ray Chest PA And Lateral; Future; Expected date: 03/21/2019    Essential hypertension  -     losartan (COZAAR) 100 MG tablet; Take 1 tablet (100 mg total) by mouth once daily.  Dispense: 30 tablet; Refill: 11  -     hydroCHLOROthiazide (HYDRODIURIL) 25 MG tablet; Take 1 tablet (25 mg total) by mouth once daily.  Dispense: 30 tablet; Refill: 11            As above   Normal neuro exam  Monitor BP at home  Split combo pill to above due to recall. Take BP med today  Increase water intake  ER for abrupt onset of new s/s

## 2019-04-09 ENCOUNTER — OFFICE VISIT (OUTPATIENT)
Dept: CARDIOLOGY | Facility: CLINIC | Age: 84
End: 2019-04-09
Payer: MEDICARE

## 2019-04-09 ENCOUNTER — CLINICAL SUPPORT (OUTPATIENT)
Dept: CARDIOLOGY | Facility: CLINIC | Age: 84
End: 2019-04-09
Payer: MEDICARE

## 2019-04-09 VITALS — DIASTOLIC BLOOD PRESSURE: 66 MMHG | HEART RATE: 57 BPM | SYSTOLIC BLOOD PRESSURE: 146 MMHG

## 2019-04-09 DIAGNOSIS — E78.00 ELEVATED CHOLESTEROL: ICD-10-CM

## 2019-04-09 DIAGNOSIS — I49.3 FREQUENT PVCS: ICD-10-CM

## 2019-04-09 DIAGNOSIS — I10 ESSENTIAL HYPERTENSION: Primary | Chronic | ICD-10-CM

## 2019-04-09 DIAGNOSIS — I10 ESSENTIAL HYPERTENSION: Primary | ICD-10-CM

## 2019-04-09 DIAGNOSIS — I45.10 RBBB: ICD-10-CM

## 2019-04-09 DIAGNOSIS — I35.0 NONRHEUMATIC AORTIC VALVE STENOSIS: ICD-10-CM

## 2019-04-09 DIAGNOSIS — I10 ESSENTIAL HYPERTENSION: ICD-10-CM

## 2019-04-09 PROCEDURE — 99214 OFFICE O/P EST MOD 30 MIN: CPT | Mod: S$PBB,,, | Performed by: INTERNAL MEDICINE

## 2019-04-09 PROCEDURE — 99999 PR PBB SHADOW E&M-EST. PATIENT-LVL III: CPT | Mod: PBBFAC,,, | Performed by: INTERNAL MEDICINE

## 2019-04-09 PROCEDURE — 99213 OFFICE O/P EST LOW 20 MIN: CPT | Mod: PBBFAC,PN | Performed by: INTERNAL MEDICINE

## 2019-04-09 PROCEDURE — 99214 PR OFFICE/OUTPT VISIT, EST, LEVL IV, 30-39 MIN: ICD-10-PCS | Mod: S$PBB,,, | Performed by: INTERNAL MEDICINE

## 2019-04-09 PROCEDURE — 93005 ELECTROCARDIOGRAM TRACING: CPT | Mod: PBBFAC,PN | Performed by: INTERNAL MEDICINE

## 2019-04-09 PROCEDURE — 99999 PR PBB SHADOW E&M-EST. PATIENT-LVL III: ICD-10-PCS | Mod: PBBFAC,,, | Performed by: INTERNAL MEDICINE

## 2019-04-09 PROCEDURE — 93010 EKG 12-LEAD: ICD-10-PCS | Mod: S$PBB,,, | Performed by: INTERNAL MEDICINE

## 2019-04-09 PROCEDURE — 93010 ELECTROCARDIOGRAM REPORT: CPT | Mod: S$PBB,,, | Performed by: INTERNAL MEDICINE

## 2019-05-02 ENCOUNTER — CLINICAL SUPPORT (OUTPATIENT)
Dept: CARDIOLOGY | Facility: CLINIC | Age: 84
End: 2019-05-02
Attending: INTERNAL MEDICINE
Payer: MEDICARE

## 2019-05-02 DIAGNOSIS — I10 ESSENTIAL HYPERTENSION: Chronic | ICD-10-CM

## 2019-05-02 DIAGNOSIS — I35.0 NONRHEUMATIC AORTIC VALVE STENOSIS: ICD-10-CM

## 2019-05-02 LAB
DIASTOLIC DYSFUNCTION: YES
MITRAL VALVE REGURGITATION: ABNORMAL
RETIRED EF AND QEF - SEE NOTES: 65 (ref 55–65)

## 2019-05-02 PROCEDURE — 93306 TTE W/DOPPLER COMPLETE: CPT | Mod: PBBFAC,PN | Performed by: INTERNAL MEDICINE

## 2019-05-02 PROCEDURE — 93306 2D ECHO WITH COLOR FLOW DOPPLER: ICD-10-PCS | Mod: 26,S$PBB,, | Performed by: INTERNAL MEDICINE

## 2019-07-16 ENCOUNTER — TELEPHONE (OUTPATIENT)
Dept: PODIATRY | Facility: CLINIC | Age: 84
End: 2019-07-16

## 2019-07-16 NOTE — TELEPHONE ENCOUNTER
Returned patients phone call regarding sooner Podiatry appointment. No answer, requested a call back.

## 2019-07-18 ENCOUNTER — OFFICE VISIT (OUTPATIENT)
Dept: CARDIOLOGY | Facility: CLINIC | Age: 84
End: 2019-07-18
Payer: MEDICARE

## 2019-07-18 VITALS
DIASTOLIC BLOOD PRESSURE: 70 MMHG | SYSTOLIC BLOOD PRESSURE: 132 MMHG | BODY MASS INDEX: 21.8 KG/M2 | HEIGHT: 64 IN | HEART RATE: 64 BPM

## 2019-07-18 DIAGNOSIS — I73.9 PVD (PERIPHERAL VASCULAR DISEASE): ICD-10-CM

## 2019-07-18 DIAGNOSIS — I10 ESSENTIAL HYPERTENSION: Chronic | ICD-10-CM

## 2019-07-18 DIAGNOSIS — I49.3 FREQUENT PVCS: Primary | ICD-10-CM

## 2019-07-18 DIAGNOSIS — R01.1 HEART MURMUR: ICD-10-CM

## 2019-07-18 PROCEDURE — 99214 OFFICE O/P EST MOD 30 MIN: CPT | Mod: S$PBB,,, | Performed by: INTERNAL MEDICINE

## 2019-07-18 PROCEDURE — 99999 PR PBB SHADOW E&M-EST. PATIENT-LVL III: CPT | Mod: PBBFAC,,, | Performed by: INTERNAL MEDICINE

## 2019-07-18 PROCEDURE — 99999 PR PBB SHADOW E&M-EST. PATIENT-LVL III: ICD-10-PCS | Mod: PBBFAC,,, | Performed by: INTERNAL MEDICINE

## 2019-07-18 PROCEDURE — 99213 OFFICE O/P EST LOW 20 MIN: CPT | Mod: PBBFAC | Performed by: INTERNAL MEDICINE

## 2019-07-18 PROCEDURE — 99214 PR OFFICE/OUTPT VISIT, EST, LEVL IV, 30-39 MIN: ICD-10-PCS | Mod: S$PBB,,, | Performed by: INTERNAL MEDICINE

## 2019-07-18 RX ORDER — FUROSEMIDE 40 MG/1
40 TABLET ORAL
Qty: 12 TABLET | Refills: 11 | Status: SHIPPED | OUTPATIENT
Start: 2019-07-19

## 2019-07-18 NOTE — PROGRESS NOTES
Subjective:   Patient ID:  Becki Luna is a 89 y.o. female who presents for follow up of Hypertension      89 yo female, came in for bradycardia. Lives with her son  PMH HTN, RBBB, PVC and HLD, walker dependent due to OA. Slipped and fell in the office. Landed on the buttock. C/o right shoulder pain . No hip pain.  C/o b/l knee pain. Fears she could not walk safely outside.  Does light house keeping work. C/o occasional dizziness.  She denied chest pain, dyspnea, palpitation, fainting and orthopnea.   Dry cough last night  Leg swelling improved. Today, ankle swelling worse. Knee pain.  Metoprolol was added for PVCs    , ECHO normal EF, mild AS;    echo normal EF and Grade II DDD  HOlter done  very frequent mostly monomorphic PVCs totalling 3410 and averaging 142 per hour.  There were 6 polymorphic couplets  EKG showed NSR, HR 61 bpm, chronic RBBB and 1st AVB, IL interval 222 ms          Past Medical History:   Diagnosis Date    Anemia     Hypertension     Hypothyroid 1988    surgically removed    PVD (peripheral vascular disease) 7/18/2019       Past Surgical History:   Procedure Laterality Date    BREAST SURGERY      CATARACT EXTRACTION      EYE SURGERY      stomach tumor      TOTAL THYROIDECTOMY         Social History     Tobacco Use    Smoking status: Never Smoker    Smokeless tobacco: Never Used   Substance Use Topics    Alcohol use: No    Drug use: No       Family History   Problem Relation Age of Onset    Diabetes Maternal Grandmother     Hypertension Sister          Review of Systems   Constitution: Negative for decreased appetite, malaise/fatigue and night sweats.   HENT: Negative for nosebleeds.    Eyes: Negative for blurred vision and double vision.   Cardiovascular: Positive for leg swelling. Negative for claudication, dyspnea on exertion, irregular heartbeat, near-syncope, orthopnea, palpitations, paroxysmal nocturnal dyspnea and syncope.   Respiratory: Positive  for cough. Negative for shortness of breath, sleep disturbances due to breathing, snoring, sputum production and wheezing.    Endocrine: Negative for cold intolerance and polyuria.   Hematologic/Lymphatic: Does not bruise/bleed easily.   Musculoskeletal: Positive for back pain. Negative for falls and joint pain.   Gastrointestinal: Negative for heartburn.   Genitourinary: Negative for dysuria, frequency and hematuria.   Neurological: Negative for difficulty with concentration, dizziness, focal weakness, light-headedness and seizures.   Psychiatric/Behavioral: Negative for depression, memory loss and substance abuse. The patient does not have insomnia.    Allergic/Immunologic: Negative for HIV exposure and hives.       Objective:   Physical Exam   Constitutional: She is oriented to person, place, and time. She appears well-nourished.   HENT:   Head: Normocephalic.   Eyes: Pupils are equal, round, and reactive to light.   Neck: Normal carotid pulses and no JVD present. Carotid bruit is not present. No thyromegaly present.   Cardiovascular: Normal rate, regular rhythm and normal pulses. PMI is not displaced. Exam reveals no gallop and no S3.   Murmur heard.  Pulses:       Radial pulses are 2+ on the right side, and 2+ on the left side.   ESM on RUSB and midLSB   Pulmonary/Chest: Breath sounds normal. No stridor. No respiratory distress.   Left lung wheezing   Abdominal: Soft. Bowel sounds are normal. There is no tenderness. There is no rebound.   Musculoskeletal: Normal range of motion. She exhibits edema.   2+ BLE edema   Neurological: She is alert and oriented to person, place, and time.   Skin: Skin is intact. No rash noted.   Psychiatric: Her behavior is normal.       Lab Results   Component Value Date    CHOL 246 (H) 12/05/2018    CHOL 174 12/04/2017    CHOL 208 (H) 10/19/2016     Lab Results   Component Value Date    HDL 72 12/05/2018    HDL 54 12/04/2017    HDL 56 10/19/2016     Lab Results   Component Value  Date    LDLCALC 152.4 12/05/2018    LDLCALC 105.0 12/04/2017    LDLCALC 117.4 10/19/2016     Lab Results   Component Value Date    TRIG 108 12/05/2018    TRIG 75 12/04/2017    TRIG 173 (H) 10/19/2016     Lab Results   Component Value Date    CHOLHDL 29.3 12/05/2018    CHOLHDL 31.0 12/04/2017    CHOLHDL 26.9 10/19/2016       Chemistry        Component Value Date/Time     03/21/2019 1726    K 4.6 03/21/2019 1726     (H) 03/21/2019 1726    CO2 19 (L) 03/21/2019 1726    BUN 25 03/21/2019 1726    CREATININE 1.1 03/21/2019 1726    GLU 96 03/21/2019 1726        Component Value Date/Time    CALCIUM 9.8 03/21/2019 1726    ALKPHOS 94 03/21/2019 1726    AST 21 03/21/2019 1726    ALT 8 (L) 03/21/2019 1726    BILITOT 0.3 03/21/2019 1726    ESTGFRAFRICA 49 (A) 03/21/2019 1726    EGFRNONAA 42 (A) 03/21/2019 1726          No results found for: LABA1C, HGBA1C  Lab Results   Component Value Date    TSH 2.914 03/21/2019     No results found for: INR, PROTIME  Lab Results   Component Value Date    WBC 3.98 03/21/2019    HGB 10.3 (L) 03/21/2019    HCT 32.8 (L) 03/21/2019     (H) 03/21/2019     03/21/2019     BMP  Sodium   Date Value Ref Range Status   03/21/2019 141 136 - 145 mmol/L Final     Potassium   Date Value Ref Range Status   03/21/2019 4.6 3.5 - 5.1 mmol/L Final     Chloride   Date Value Ref Range Status   03/21/2019 111 (H) 95 - 110 mmol/L Final     CO2   Date Value Ref Range Status   03/21/2019 19 (L) 23 - 29 mmol/L Final     BUN, Bld   Date Value Ref Range Status   03/21/2019 25 10 - 30 mg/dL Final     Creatinine   Date Value Ref Range Status   03/21/2019 1.1 0.5 - 1.4 mg/dL Final     Calcium   Date Value Ref Range Status   03/21/2019 9.8 8.7 - 10.5 mg/dL Final     Anion Gap   Date Value Ref Range Status   03/21/2019 11 8 - 16 mmol/L Final     eGFR if    Date Value Ref Range Status   03/21/2019 49 (A) >60 mL/min/1.73 m^2 Final     eGFR if non    Date Value Ref Range  Status   03/21/2019 42 (A) >60 mL/min/1.73 m^2 Final     Comment:     Calculation used to obtain the estimated glomerular filtration  rate (eGFR) is the CKD-EPI equation.        BNP  @LABRCNTIP(BNP,BNPTRIAGEBLO)@  @LABRCNTIP(troponini)@  CrCl cannot be calculated (Patient's most recent lab result is older than the maximum 7 days allowed.).  No results found in the last 24 hours.  No results found in the last 24 hours.  No results found in the last 24 hours.    Assessment:      1. Frequent PVCs    2. PVD (peripheral vascular disease)    3. Heart murmur    4. Essential hypertension      Ankle and leg swelling   PVC improved  No CP    Plan:   Add Lasix 20 mg on MWF for 2 weeks and then take as needed  Take Lasix with HCTZ  check BMP in 2 weeks  Continue BB, losartan and ASA  Fall precaution  RTC in 2 months

## 2019-07-22 RX ORDER — LEVOTHYROXINE SODIUM 125 UG/1
TABLET ORAL
Qty: 30 TABLET | Refills: 11 | Status: SHIPPED | OUTPATIENT
Start: 2019-07-22

## 2019-09-17 ENCOUNTER — LAB VISIT (OUTPATIENT)
Dept: LAB | Facility: HOSPITAL | Age: 84
End: 2019-09-17
Attending: INTERNAL MEDICINE
Payer: MEDICARE

## 2019-09-17 ENCOUNTER — OFFICE VISIT (OUTPATIENT)
Dept: CARDIOLOGY | Facility: CLINIC | Age: 84
End: 2019-09-17
Payer: MEDICARE

## 2019-09-17 VITALS
WEIGHT: 125 LBS | BODY MASS INDEX: 21.34 KG/M2 | HEIGHT: 64 IN | DIASTOLIC BLOOD PRESSURE: 78 MMHG | SYSTOLIC BLOOD PRESSURE: 136 MMHG | HEART RATE: 72 BPM

## 2019-09-17 DIAGNOSIS — I49.3 FREQUENT PVCS: ICD-10-CM

## 2019-09-17 DIAGNOSIS — I73.9 PVD (PERIPHERAL VASCULAR DISEASE): Primary | ICD-10-CM

## 2019-09-17 DIAGNOSIS — I35.0 NONRHEUMATIC AORTIC VALVE STENOSIS: ICD-10-CM

## 2019-09-17 DIAGNOSIS — I73.9 PVD (PERIPHERAL VASCULAR DISEASE): ICD-10-CM

## 2019-09-17 DIAGNOSIS — I10 ESSENTIAL HYPERTENSION: Chronic | ICD-10-CM

## 2019-09-17 PROCEDURE — 99999 PR PBB SHADOW E&M-EST. PATIENT-LVL III: ICD-10-PCS | Mod: PBBFAC,,, | Performed by: INTERNAL MEDICINE

## 2019-09-17 PROCEDURE — 99214 PR OFFICE/OUTPT VISIT, EST, LEVL IV, 30-39 MIN: ICD-10-PCS | Mod: S$PBB,,, | Performed by: INTERNAL MEDICINE

## 2019-09-17 PROCEDURE — 99999 PR PBB SHADOW E&M-EST. PATIENT-LVL III: CPT | Mod: PBBFAC,,, | Performed by: INTERNAL MEDICINE

## 2019-09-17 PROCEDURE — 80048 BASIC METABOLIC PNL TOTAL CA: CPT

## 2019-09-17 PROCEDURE — 36415 COLL VENOUS BLD VENIPUNCTURE: CPT

## 2019-09-17 PROCEDURE — 99213 OFFICE O/P EST LOW 20 MIN: CPT | Mod: PBBFAC | Performed by: INTERNAL MEDICINE

## 2019-09-17 PROCEDURE — 99214 OFFICE O/P EST MOD 30 MIN: CPT | Mod: S$PBB,,, | Performed by: INTERNAL MEDICINE

## 2019-09-17 RX ORDER — LOSARTAN POTASSIUM AND HYDROCHLOROTHIAZIDE 25; 100 MG/1; MG/1
1 TABLET ORAL DAILY
COMMUNITY

## 2019-09-17 NOTE — PROGRESS NOTES
Subjective:   Patient ID:  Becki Luna is a 89 y.o. female who presents for follow up of PVCs      89 yo female, came in for 2 months f/u Lives with her son  PMH HTN, RBBB, leg swelling/pVD, PVC and HLD, walker dependent due to OA.   C/o b/l knee pain. Fears she could not walk safely outside.  Does light house keeping work. C/o occasional dizziness.  She denied chest pain, dyspnea, palpitation, fainting and orthopnea.   Dry cough last night  Leg swelling improved and on lasix prn  Metoprolol was added for PVCs    , ECHO normal EF, mild AS;    echo normal EF and Grade II DDD  HOlter done  very frequent mostly monomorphic PVCs totalling 3410 and averaging 142 per hour.  There were 6 polymorphic couplets  EKG showed NSR, HR 61 bpm, chronic RBBB and 1st AVB, AK interval 222 ms    Addendum in   Right knee Fx and eval at [facility] nonoperable. And d/c to SNF      Past Medical History:   Diagnosis Date    Anemia     Hypertension     Hypothyroid 1988    surgically removed    PVD (peripheral vascular disease) 7/18/2019       Past Surgical History:   Procedure Laterality Date    BREAST SURGERY      CATARACT EXTRACTION      EYE SURGERY      stomach tumor      TOTAL THYROIDECTOMY         Social History     Tobacco Use    Smoking status: Never Smoker    Smokeless tobacco: Never Used   Substance Use Topics    Alcohol use: No    Drug use: No       Family History   Problem Relation Age of Onset    Diabetes Maternal Grandmother     Hypertension Sister          Review of Systems   Constitution: Negative for decreased appetite, malaise/fatigue and night sweats.   HENT: Negative for nosebleeds.    Eyes: Negative for blurred vision and double vision.   Cardiovascular: Positive for leg swelling. Negative for claudication, dyspnea on exertion, irregular heartbeat, near-syncope, orthopnea, palpitations, paroxysmal nocturnal dyspnea and syncope.   Respiratory: Positive for cough. Negative for  shortness of breath, sleep disturbances due to breathing, snoring, sputum production and wheezing.    Endocrine: Negative for cold intolerance and polyuria.   Hematologic/Lymphatic: Does not bruise/bleed easily.   Musculoskeletal: Positive for back pain. Negative for falls and joint pain.   Gastrointestinal: Negative for heartburn.   Genitourinary: Negative for dysuria, frequency and hematuria.   Neurological: Negative for difficulty with concentration, dizziness, focal weakness, light-headedness and seizures.   Psychiatric/Behavioral: Negative for depression, memory loss and substance abuse. The patient does not have insomnia.    Allergic/Immunologic: Negative for HIV exposure and hives.       Objective:   Physical Exam   Constitutional: She is oriented to person, place, and time. She appears well-nourished.   HENT:   Head: Normocephalic.   Eyes: Pupils are equal, round, and reactive to light.   Neck: Normal carotid pulses and no JVD present. Carotid bruit is not present. No thyromegaly present.   Cardiovascular: Normal rate, regular rhythm and normal pulses. PMI is not displaced. Exam reveals no gallop and no S3.   Murmur heard.  Pulses:       Radial pulses are 2+ on the right side, and 2+ on the left side.   ESM on RUSB and midLSB   Pulmonary/Chest: Breath sounds normal. No stridor. No respiratory distress.   Abdominal: Soft. Bowel sounds are normal. There is no tenderness. There is no rebound.   Musculoskeletal: Normal range of motion. She exhibits no edema.   Neurological: She is alert and oriented to person, place, and time.   Skin: Skin is intact. No rash noted.   Psychiatric: Her behavior is normal.       Lab Results   Component Value Date    CHOL 246 (H) 12/05/2018    CHOL 174 12/04/2017    CHOL 208 (H) 10/19/2016     Lab Results   Component Value Date    HDL 72 12/05/2018    HDL 54 12/04/2017    HDL 56 10/19/2016     Lab Results   Component Value Date    LDLCALC 152.4 12/05/2018    LDLCALC 105.0 12/04/2017     LDLCALC 117.4 10/19/2016     Lab Results   Component Value Date    TRIG 108 12/05/2018    TRIG 75 12/04/2017    TRIG 173 (H) 10/19/2016     Lab Results   Component Value Date    CHOLHDL 29.3 12/05/2018    CHOLHDL 31.0 12/04/2017    CHOLHDL 26.9 10/19/2016       Chemistry        Component Value Date/Time     03/21/2019 1726    K 4.6 03/21/2019 1726     (H) 03/21/2019 1726    CO2 19 (L) 03/21/2019 1726    BUN 25 03/21/2019 1726    CREATININE 1.1 03/21/2019 1726    GLU 96 03/21/2019 1726        Component Value Date/Time    CALCIUM 9.8 03/21/2019 1726    ALKPHOS 94 03/21/2019 1726    AST 21 03/21/2019 1726    ALT 8 (L) 03/21/2019 1726    BILITOT 0.3 03/21/2019 1726    ESTGFRAFRICA 49 (A) 03/21/2019 1726    EGFRNONAA 42 (A) 03/21/2019 1726          No results found for: LABA1C, HGBA1C  Lab Results   Component Value Date    TSH 2.914 03/21/2019     No results found for: INR, PROTIME  Lab Results   Component Value Date    WBC 3.98 03/21/2019    HGB 10.3 (L) 03/21/2019    HCT 32.8 (L) 03/21/2019     (H) 03/21/2019     03/21/2019     BMP  Sodium   Date Value Ref Range Status   03/21/2019 141 136 - 145 mmol/L Final     Potassium   Date Value Ref Range Status   03/21/2019 4.6 3.5 - 5.1 mmol/L Final     Chloride   Date Value Ref Range Status   03/21/2019 111 (H) 95 - 110 mmol/L Final     CO2   Date Value Ref Range Status   03/21/2019 19 (L) 23 - 29 mmol/L Final     BUN, Bld   Date Value Ref Range Status   03/21/2019 25 10 - 30 mg/dL Final     Creatinine   Date Value Ref Range Status   03/21/2019 1.1 0.5 - 1.4 mg/dL Final     Calcium   Date Value Ref Range Status   03/21/2019 9.8 8.7 - 10.5 mg/dL Final     Anion Gap   Date Value Ref Range Status   03/21/2019 11 8 - 16 mmol/L Final     eGFR if    Date Value Ref Range Status   03/21/2019 49 (A) >60 mL/min/1.73 m^2 Final     eGFR if non    Date Value Ref Range Status   03/21/2019 42 (A) >60 mL/min/1.73 m^2 Final      Comment:     Calculation used to obtain the estimated glomerular filtration  rate (eGFR) is the CKD-EPI equation.        BNP  @LABRCNTIP(BNP,BNPTRIAGEBLO)@  @LABRCNTIP(troponini)@  CrCl cannot be calculated (Patient's most recent lab result is older than the maximum 7 days allowed.).  No results found in the last 24 hours.  No results found in the last 24 hours.  No results found in the last 24 hours.    Assessment:      1. PVD (peripheral vascular disease)    2. Frequent PVCs    3. Essential hypertension    4. Nonrheumatic aortic valve stenosis      Leg swelling/PVD resolved    Plan:   Continue Lasix prn for leg swelling  Continue ASA, Hyzaar and metoprolol  Fall precaution  Continue current meds.  Recommend heart-healthy diet, weight control and regular exercise.  Lulu. Risk modification.   RTC in 6 months    I have reviewed all pertinent labs and cardiac studies. Plans and recommendations have been formulated under my direct supervision. All questions answered and patient voiced understanding. Patient to continue current medications.

## 2019-09-18 ENCOUNTER — TELEPHONE (OUTPATIENT)
Dept: PODIATRY | Facility: CLINIC | Age: 84
End: 2019-09-18

## 2019-09-18 LAB
ANION GAP SERPL CALC-SCNC: 9 MMOL/L (ref 8–16)
BUN SERPL-MCNC: 31 MG/DL (ref 10–30)
CALCIUM SERPL-MCNC: 9.2 MG/DL (ref 8.7–10.5)
CHLORIDE SERPL-SCNC: 106 MMOL/L (ref 95–110)
CO2 SERPL-SCNC: 22 MMOL/L (ref 23–29)
CREAT SERPL-MCNC: 1.4 MG/DL (ref 0.5–1.4)
EST. GFR  (AFRICAN AMERICAN): 36.3 ML/MIN/1.73 M^2
EST. GFR  (NON AFRICAN AMERICAN): 31.5 ML/MIN/1.73 M^2
GLUCOSE SERPL-MCNC: 80 MG/DL (ref 70–110)
POTASSIUM SERPL-SCNC: 4.3 MMOL/L (ref 3.5–5.1)
SODIUM SERPL-SCNC: 137 MMOL/L (ref 136–145)

## 2019-09-18 NOTE — TELEPHONE ENCOUNTER
Contacted Pt Explained that Dr De La Fuente had no availability until November. Offered next available w/ another provider, Pt agreed to terms and accepted appt.

## 2019-09-18 NOTE — TELEPHONE ENCOUNTER
----- Message from Miley Valentine sent at 9/18/2019  4:14 PM CDT -----  Contact: Ms. Aguayo        .Type:  Sooner Apoointment Request    Caller is requesting a sooner appointment.  Caller declined first available appointment listed below.  Caller will not accept being placed on the waitlist and is requesting a message be sent to doctor.  Name of Caller:    When is the first available appointment? 10/24  Symptoms: routine nail care   Would the patient rather a call back or a response via [facility]?  Call back   Best Call Back Number: [phone]  Additional Information:

## 2019-09-20 ENCOUNTER — TELEPHONE (OUTPATIENT)
Dept: CARDIOLOGY | Facility: CLINIC | Age: 84
End: 2019-09-20

## 2019-09-20 ENCOUNTER — OFFICE VISIT (OUTPATIENT)
Dept: PODIATRY | Facility: CLINIC | Age: 84
End: 2019-09-20
Payer: MEDICARE

## 2019-09-20 VITALS
SYSTOLIC BLOOD PRESSURE: 117 MMHG | DIASTOLIC BLOOD PRESSURE: 81 MMHG | HEART RATE: 81 BPM | WEIGHT: 125 LBS | HEIGHT: 64 IN | BODY MASS INDEX: 21.34 KG/M2

## 2019-09-20 DIAGNOSIS — B35.1 ONYCHOMYCOSIS DUE TO DERMATOPHYTE: Primary | ICD-10-CM

## 2019-09-20 DIAGNOSIS — D64.9 ANEMIA, UNSPECIFIED TYPE: ICD-10-CM

## 2019-09-20 DIAGNOSIS — I73.9 PVD (PERIPHERAL VASCULAR DISEASE): ICD-10-CM

## 2019-09-20 PROCEDURE — 99213 OFFICE O/P EST LOW 20 MIN: CPT | Mod: PBBFAC | Performed by: PODIATRIST

## 2019-09-20 PROCEDURE — 99999 PR PBB SHADOW E&M-EST. PATIENT-LVL III: CPT | Mod: PBBFAC,,, | Performed by: PODIATRIST

## 2019-09-20 PROCEDURE — 11719 TRIM NAIL(S) ANY NUMBER: CPT | Mod: Q8,S$PBB,, | Performed by: PODIATRIST

## 2019-09-20 PROCEDURE — 99213 OFFICE O/P EST LOW 20 MIN: CPT | Mod: S$PBB,25,, | Performed by: PODIATRIST

## 2019-09-20 PROCEDURE — 99999 PR PBB SHADOW E&M-EST. PATIENT-LVL III: ICD-10-PCS | Mod: PBBFAC,,, | Performed by: PODIATRIST

## 2019-09-20 PROCEDURE — 11719 TRIM NAIL(S) ANY NUMBER: CPT | Mod: Q8,PBBFAC | Performed by: PODIATRIST

## 2019-09-20 PROCEDURE — 99213 PR OFFICE/OUTPT VISIT, EST, LEVL III, 20-29 MIN: ICD-10-PCS | Mod: S$PBB,25,, | Performed by: PODIATRIST

## 2019-09-20 PROCEDURE — 11719 PR TRIM NAIL(S): ICD-10-PCS | Mod: Q8,S$PBB,, | Performed by: PODIATRIST

## 2019-09-20 NOTE — PATIENT INSTRUCTIONS
Nail Fungal Infection  A nail fungal infection changes the way fingernails and toenails look. They may thicken, discolor, change shape, or split. This condition is hard to treat because nails grow slowly and have limited blood supply. The infection often comes back after treatment.  There are 2 types of medicines used to treat this condition:  · Topical anti-fungal medicines. These are applied to the surface of the skin and nail area. These medicines are not very effective because they cant get deep into the nail.  · Oral antifungal medicines. These medicines work better because they go into the nail from the inside out. But the infection may still come back. It may take 9 to 12 months for your nail to look normal again. This means you are cured. You can repeat treatment if needed. Most people take these medicines without any problems. It is rare to stop therapy because of side effects. But your healthcare provider may give you some monitoring tests. Talk about possible side effects with your provider before starting treatment.  If medicines fail, the nail can be removed surgically or chemically. These methods physically remove the fungus from the body. This helps medical treatment be more effective.  Home care  · Use medicines exactly as directed for as long as directed. Treating a fungal infection can take longer than other kinds of infections.  · Smoking is a risk factor for fungal infection. This is one more reason to quit.  · Wear absorbent socks, and shoes that let your feet breathe. Sweaty feet increase your risk of fungal infection. They also make an existing infection harder to treat.  · Use footwear when in damp public places like swimming pools, gyms, and shower rooms. This will help you avoid the fungus that grows there.  · Don't share nail clippers or scissors with others.  Follow-up care  Follow up with your healthcare provider, or as advised.  When to seek medical advice  Call your healthcare  provider right away if any of these occur:  · Skin by the nail becomes red, swollen, painful, or drains pus (a creamy yellow or white liquid)  · Side effects from oral anti-fungal medicines  Date Last Reviewed: 8/1/2016  © 7622-9613 [employer]. [address] [place] [address]. All rights reserved. This information is not intended as a substitute for professional medical care. Always follow your healthcare professional's instructions.

## 2019-09-20 NOTE — TELEPHONE ENCOUNTER
Attempted without success to contact pt with Lab stable.  Left vm to return call.  Results also on portal.

## 2019-09-20 NOTE — PROGRESS NOTES
Subjective:       Patient ID: Becki Luna is a 89 y.o. female.-Q8    Chief Complaint: Nail Care (Pt states nails need care, non diabetic pt, pt states pain R  foot bunion yesterday, rates pain 4/10, wears casual shoes w/ socks. PCP Dr Farfan, last visit; 12/06/18)      HPI: Patient presents to the office today with the chief complaint of elongated, thickened and dystrophic nail plates to the B/L foot.  This patient does have a PMHx. of Peripheral Angiopathy. Patient does follow with Primary Care for management of comorbid states. This patient's PMD is Jun Farfan MD. This patient last saw his/her primary care provider on 12/6/18.  Patient has out documented high risk ft requiring routine maintenance secondary to peripheral vascular disease and anemia.    Review of patient's allergies indicates:   Allergen Reactions    Boniva [ibandronate] Other (See Comments)     pain    Celecoxib Nausea And Vomiting    Naproxen Rash       Past Medical History:   Diagnosis Date    Anemia     Hypertension     Hypothyroid 1988    surgically removed    PVD (peripheral vascular disease) 7/18/2019       Family History   Problem Relation Age of Onset    Diabetes Maternal Grandmother     Hypertension Sister        Social History     Socioeconomic History    Marital status:      Spouse name: Not on file    Number of children: 7    Years of education: 12    Highest education level: Not on file   Occupational History    Occupation: nurse assistant     Comment: retired in 1988   Social Needs    Financial resource strain: Not hard at all    Food insecurity:     Worry: Never true     Inability: Never true    Transportation needs:     Medical: No     Non-medical: No   Tobacco Use    Smoking status: Never Smoker    Smokeless tobacco: Never Used   Substance and Sexual Activity    Alcohol use: No    Drug use: No    Sexual activity: Never   Lifestyle    Physical activity:     Days per week: 0 days     Minutes  "per session: 0 min    Stress: Not at all   Relationships    Social connections:     Talks on phone: More than three times a week     Gets together: More than three times a week     Attends [employer] service: More than 4 times per year     Active member of club or organization: No     Attends meetings of clubs or organizations: Never     Relationship status:    Other Topics Concern    Not on file   Social History Narrative    Not on file       Past Surgical History:   Procedure Laterality Date    BREAST SURGERY      CATARACT EXTRACTION      EYE SURGERY      stomach tumor      TOTAL THYROIDECTOMY         Review of Systems   Constitutional: Negative for activity change, appetite change, chills and fever.   HENT: Negative for sinus pain, sore throat and voice change.    Eyes: Negative for pain, redness and visual disturbance.   Respiratory: Negative for cough and shortness of breath.    Cardiovascular: Negative for chest pain and palpitations.   Gastrointestinal: Negative for diarrhea, nausea and vomiting.   Musculoskeletal: Positive for arthralgias, back pain and joint swelling.   Skin: Negative for color change, pallor, rash and wound.   Neurological: Negative for dizziness, weakness and numbness.   Psychiatric/Behavioral: The patient is not nervous/anxious.           Objective:   /81   Pulse 81   Ht 5' 4" (1.626 m)   Wt 56.7 kg (125 lb)   BMI 21.46 kg/m²     Physical Exam   Constitutional: She is oriented to person, place, and time. Vital signs are normal. No distress.   HENT:   Head: Normocephalic and atraumatic.   Cardiovascular: Normal rate.   Pulmonary/Chest: Effort normal. No respiratory distress. She exhibits no tenderness.   Neurological: She is alert and oriented to person, place, and time. Coordination normal.   Skin: No rash noted. She is not diaphoretic. No erythema. No pallor.   Nursing note and vitals reviewed.    LOWER EXTREMITY PHYSICAL EXAMINATION    VASCULAR:  Dorsalis pedis " pulses 0.4 bilateral, posterior tibial pulses 1/4 bilateral capillary fill time less than 3 sec bilateral. Mild edema present bilateral.  Varicosities were absent bilateral.  Skin temperature is warm to cool from proximal distal.  Hair growth is absent bilateral.     NEUROLOGY: Protective sensation is intact to the left and right plantar surfaces of the foot and digits using a 5.07 [employer] monofilament. Sensation to light touch is intact on the left and right foot. Proprioception is intact, bilateral.     DERMATOLOGY: On the left foot, nails 1, 2, 3, 4 and 5 are suggestive of onychomycotic changes. On the right foot, nails 1, 2, 3, 4 and 5 are suggestive of onychomycotic changes. These nail plates are thickened, are dystrophic, chaulky in appearance and malodorous with substantial subungual debris. These nail plates are yellow to brown in appearance.     ORTHOPEDIC: Manual Muscle Testing is 5/5 in all planes on the left and right, without pains, with and without resistance.  Muscle tone is normal bilateral    Assessment:     1. Onychomycosis due to dermatophyte    2. PVD (peripheral vascular disease)    3. Anemia, unspecified type        Plan:     Onychomycosis due to dermatophyte    PVD (peripheral vascular disease)    Anemia, unspecified type      I counseled patient about her condition regarding the findings of my examination impression with the usual treatment plan.  We discussed the importance of wearing the this patient. We discussed the importance wearing shoes to protect the foot when walking.  With the patient's permission nails 1 through 5 bilaterally were trimmed in length, thickness and girth aggressively to the soft tissue attachment mechanically and with electric .  All offending nail and debris was removed.  The patient relates relief following the procedure.    Patient will continue to monitor the feet daily and wear protective shoes when ambulating.  She will follow up 6 months or  sooner if need      Future Appointments   Date Time Provider Department Center   10/15/2019  2:00 PM ANNUAL WELLNESS VISIT-NURSE PRACTITIONER JULIET GARNICA [facility]   3/19/2020 11:20 AM MD DANIKA Santiago C[facility]   3/23/2020  1:00 PM Nery Junior DPM [facility]

## 2019-10-20 RX ORDER — LEVOTHYROXINE SODIUM 125 UG/1
TABLET ORAL
Qty: 30 TABLET | Refills: 0 | Status: SHIPPED | OUTPATIENT
Start: 2019-10-20

## 2019-11-26 ENCOUNTER — PATIENT MESSAGE (OUTPATIENT)
Dept: INTERNAL MEDICINE | Facility: CLINIC | Age: 84
End: 2019-11-26

## 2019-11-27 NOTE — TELEPHONE ENCOUNTER
Please let her daughter Lisa know her moms letter is ready/done today. Please send to her email [email or web address]    Thanks

## 2019-12-01 RX ORDER — LOSARTAN POTASSIUM AND HYDROCHLOROTHIAZIDE 25; 100 MG/1; MG/1
TABLET ORAL
Qty: 30 TABLET | Refills: 1 | Status: SHIPPED | OUTPATIENT
Start: 2019-12-01 | End: 2020-05-20

## 2020-01-03 ENCOUNTER — PATIENT OUTREACH (OUTPATIENT)
Dept: ADMINISTRATIVE | Facility: CLINIC | Age: 85
End: 2020-01-03

## 2020-01-03 NOTE — PROGRESS NOTES
C3 nurse attempted to contact patient. No answer. The following message was left for the patient to return the call:  Good morning, I am a nurse calling on behalf of [facility] from the Care Coordination [facility].  This is a Transitional Care Call for Becki Luna. When you have a moment please contact us at [phone] or [phone] Monday through Friday, between the hours of 8 am to 4 pm. We look forward to speaking with you. On behalf of [facility] have a nice day.    The patient does not have a scheduled HOSFU appointment within 7-14 days post hospital discharge date 1/2. Message sent to Physician staff to assist with HOSFU appointment scheduling.

## 2020-01-21 ENCOUNTER — TELEPHONE (OUTPATIENT)
Dept: INTERNAL MEDICINE | Facility: CLINIC | Age: 85
End: 2020-01-21

## 2020-01-21 NOTE — TELEPHONE ENCOUNTER
----- Message from Jagruti Quintana sent at 1/21/2020  3:56 PM CST -----  Contact: Lisa Aguayo/daughter [phone]  Type:  Patient Returning Call    Who Called: Lisa Aguayo/daughter  Who Left Message for Patient:Roscoe  Does the patient know what this is regarding?: unk  Would the patient rather a call back or a response via [facility]? Call back   Best Call Back Number:[phone]  Additional Information:

## 2020-01-21 NOTE — TELEPHONE ENCOUNTER
----- Message from Ramonita Urias sent at 1/21/2020 11:17 AM CST -----  ..Type:  Patient Returning Call    Who Called: pt   Who Left Message for Patient:  Does the patient know what this is regarding?: unknown   Would the patient rather a call back or a response via [facility]? Call back   Best Call Back Number: [phone]  Additional Information: Pt is returning a call to nurse.

## 2020-01-27 ENCOUNTER — TELEPHONE (OUTPATIENT)
Dept: INTERNAL MEDICINE | Facility: CLINIC | Age: 85
End: 2020-01-27

## 2020-01-27 NOTE — TELEPHONE ENCOUNTER
----- Message from Spring Berumen sent at 1/27/2020  3:21 PM CST -----  Contact: Lisa-daughter  Type:  Patient Returning Call    Who Called:Lisa-daughter  Who Left Message for Patient:  Does the patient know what this is regarding?:  Would the patient rather a call back or a response via [facility]? call  Best Call Back Number:[phone]  Additional Information:

## 2020-01-27 NOTE — TELEPHONE ENCOUNTER
I returned called to pt's daughter. She states that she believed some one was contacting her to schedule follow up appointment. I was able to schedule appointment with Dr. Farfan. Pt thanked me and ended call. //BJ

## 2020-02-03 ENCOUNTER — TELEPHONE (OUTPATIENT)
Dept: INTERNAL MEDICINE | Facility: CLINIC | Age: 85
End: 2020-02-03

## 2020-02-03 NOTE — TELEPHONE ENCOUNTER
----- Message from Bea Holder sent at 2/3/2020  1:08 PM CST -----  Contact: Patient's daughter- Lias Aguayo  Patient's daughter would like a call back concerning getting a prescription for Lidocaine patches, 700 mg. Please send to the [facility] on [address]. Please call to advise at Ph .[phone]. Insurance will not cover the medication otherwise.

## 2020-02-03 NOTE — TELEPHONE ENCOUNTER
Tried to contact the pt  Regarding results there was no answer and no option to leave a message.//LB

## 2020-02-05 ENCOUNTER — TELEPHONE (OUTPATIENT)
Dept: INTERNAL MEDICINE | Facility: CLINIC | Age: 85
End: 2020-02-05

## 2020-02-05 NOTE — LETTER
February 18, 2020    Becki Luna  [address] [address]             [facility]  Internal Medicine  [address] [address] [facility] [address]  Phone: [phone]  Fax: [phone]   February 18, 2020     Patient: Becki Luna   YOB: 1921   Date of Visit: 2/5/2020       To Whom it May Concern:    Please contact Dr. Farfan office regarding a follow up appointment.  Dr. Farfan will like you to schedule an appointment at your earliest convenience.  Please contact appointment at [phone].      If you have any questions or concerns, please don't hesitate to call.    Sincerely,         Roscoe Verdin MA

## 2020-02-05 NOTE — TELEPHONE ENCOUNTER
----- Message from Lucia Neumann sent at 2/5/2020 12:36 PM CST -----  Contact: care giver- ruthy   She's calling in regards to pt ; Daily small dark black BM       pls call pt back at [phone]

## 2020-02-18 ENCOUNTER — TELEPHONE (OUTPATIENT)
Dept: INTERNAL MEDICINE | Facility: CLINIC | Age: 85
End: 2020-02-18

## 2020-02-18 NOTE — TELEPHONE ENCOUNTER
Pt rescheduled follow up appt with PCP on 03/02/2020.  Pt daughter declined to schedule an earlier appt with another provider.  charlette

## 2020-02-18 NOTE — TELEPHONE ENCOUNTER
----- Message from Toshia Noriega sent at 2/18/2020  8:20 AM CST -----  Contact: Lisa/[phone]  Type:  Patient Returning Call    Who Called:lisa  Who Left Message for Patient:nurse  Does the patient know what this is regarding?:no  Would the patient rather a call back or a response via [facility]? Call back  Best Call Back Number:[phone]  Additional Information:

## 2020-02-28 ENCOUNTER — TELEPHONE (OUTPATIENT)
Dept: HOME HEALTH SERVICES | Facility: HOSPITAL | Age: 85
End: 2020-02-28

## 2020-03-24 PROCEDURE — G0179 PR HOME HEALTH MD RECERTIFICATION: ICD-10-PCS | Mod: ,,, | Performed by: FAMILY MEDICINE

## 2020-03-24 PROCEDURE — G0179 MD RECERTIFICATION HHA PT: HCPCS | Mod: ,,, | Performed by: FAMILY MEDICINE

## 2020-04-08 ENCOUNTER — TELEPHONE (OUTPATIENT)
Dept: HOME HEALTH SERVICES | Facility: HOSPITAL | Age: 85
End: 2020-04-08

## 2020-04-08 ENCOUNTER — DOCUMENT SCAN (OUTPATIENT)
Dept: HOME HEALTH SERVICES | Facility: HOSPITAL | Age: 85
End: 2020-04-08
Payer: MEDICARE

## 2020-04-08 ENCOUNTER — EXTERNAL HOME HEALTH (OUTPATIENT)
Dept: HOME HEALTH SERVICES | Facility: HOSPITAL | Age: 85
End: 2020-04-08
Payer: MEDICARE

## 2020-04-29 ENCOUNTER — TELEPHONE (OUTPATIENT)
Dept: INTERNAL MEDICINE | Facility: CLINIC | Age: 85
End: 2020-04-29

## 2020-04-29 NOTE — TELEPHONE ENCOUNTER
I s/w the pts daughter Lisa regarding a wound on her buttocks and I gave her a fax number to [facility] so Annabella could sign the order for HH since Adolph is out of office today. I informed the provider as well. //kah

## 2020-04-29 NOTE — TELEPHONE ENCOUNTER
I tried to return a call to the pts family member regarding wound on rear end and there was no answer. The pt was requesting an in office appt. I left a vm asking she call back. //kah

## 2020-04-29 NOTE — TELEPHONE ENCOUNTER
----- Message from Jaden Rosenberg sent at 4/29/2020  1:17 PM CDT -----  Contact: Lisa (daughter)  Type:  Patient Returning Call    Who Called: Lisa (daughter)    Who Left Message for Patient: Dorothy    Does the patient know what this is regarding?: yes    Would the patient rather a call back or a response via [facility]? call    Best Call Back Number: [phone]    Additional Information:

## 2020-04-29 NOTE — TELEPHONE ENCOUNTER
----- Message from Nain Diana sent at 4/29/2020  9:17 AM CDT -----  Contact: Lisa  ..Name of Caller  Pt   Reason for Visit/Symptoms wound on rear end   Best Contact Number or Confirm if Mychart Preferred .[phone]   Preferred Date/Time of Appointment 4/30   Interested in Virtual Visit (yes/no) no   Additional Information

## 2020-05-14 ENCOUNTER — PATIENT OUTREACH (OUTPATIENT)
Dept: ADMINISTRATIVE | Facility: OTHER | Age: 85
End: 2020-05-14

## 2020-05-18 ENCOUNTER — TELEPHONE (OUTPATIENT)
Dept: INTERNAL MEDICINE | Facility: CLINIC | Age: 85
End: 2020-05-18

## 2020-05-18 NOTE — TELEPHONE ENCOUNTER
----- Message from Renée Nickerson sent at 5/18/2020  9:00 AM CDT -----  Contact: Joseline Physical Therapist  Type:  Needs Medical Advice    Who Called:  Joseline Physical Therapist  Symptoms (please be specific): referral  How long has patient had these symptoms:    Pharmacy name and phone #:    Would the patient rather a call back or a response via [facility]?  Call back  Best Call Back Number: phone [phone] fax [phone]  Additional Information: States that Pt broke knee in Jan 2020 still has on brace. Also, can you refer her to a Ortho Dr and a pain management Dr as well. She is only laying in bed and is in pain. Thanks.

## 2020-05-19 ENCOUNTER — DOCUMENT SCAN (OUTPATIENT)
Dept: HOME HEALTH SERVICES | Facility: HOSPITAL | Age: 85
End: 2020-05-19
Payer: MEDICARE

## 2020-05-20 ENCOUNTER — TELEPHONE (OUTPATIENT)
Dept: INTERNAL MEDICINE | Facility: CLINIC | Age: 85
End: 2020-05-20

## 2020-05-20 RX ORDER — LOSARTAN POTASSIUM AND HYDROCHLOROTHIAZIDE 25; 100 MG/1; MG/1
TABLET ORAL
Qty: 30 TABLET | Refills: 1 | Status: SHIPPED | OUTPATIENT
Start: 2020-05-20

## 2020-05-20 NOTE — TELEPHONE ENCOUNTER
----- Message from Kirstie Gillis sent at 5/20/2020  9:50 AM CDT -----  Contact: [facility] [place] [phone] - [facility]  Please call caller back at [phone] (home)

## 2020-05-21 ENCOUNTER — DOCUMENT SCAN (OUTPATIENT)
Dept: HOME HEALTH SERVICES | Facility: HOSPITAL | Age: 85
End: 2020-05-21
Payer: MEDICARE

## 2020-05-21 NOTE — TELEPHONE ENCOUNTER
Spoke with Cynthia Ruiz , she stated that Ms. Luna had a black, tarry stool, and wanted to notify us they ordered a stool sample/jj

## 2020-05-21 NOTE — TELEPHONE ENCOUNTER
----- Message from Amaury Gonzalez sent at 5/21/2020 10:25 AM CDT -----  Contact: [facility]-Cynthia  Caller is calling in regards to a stool sample they requested a couple of days ago. Please call Cynthia back at [phone] or [phone] Jeana

## 2020-05-22 ENCOUNTER — PATIENT OUTREACH (OUTPATIENT)
Dept: ADMINISTRATIVE | Facility: HOSPITAL | Age: 85
End: 2020-05-22

## 2020-05-22 NOTE — PROGRESS NOTES
Outreached to pt daughter to discuss upcoming PC appt & Overdue HM:     Daughter prefers to speak with PCP before scheduling.     Health Maintenance Due   Topic    TETANUS VACCINE     DEXA SCAN     Lipid Panel

## 2020-05-26 ENCOUNTER — TELEPHONE (OUTPATIENT)
Dept: INTERNAL MEDICINE | Facility: CLINIC | Age: 85
End: 2020-05-26

## 2020-05-26 NOTE — TELEPHONE ENCOUNTER
----- Message from Caroline Severino sent at 5/26/2020 12:48 PM CDT -----  Contact: pt  Type:  Sooner Apoointment Request    Caller is requesting a sooner appointment.  Caller declined first available appointment listed below.  Caller will not accept being placed on the waitlist and is requesting a message be sent to doctor.  Name of Caller: the pt   When is the first available appointment? 08/01/2020  Symptoms: 1wk f/u  Would the patient rather a call back or a response via [facility]? Call back  Best Call Back Number: [phone]  Additional Information: n/a

## 2020-05-28 ENCOUNTER — DOCUMENT SCAN (OUTPATIENT)
Dept: HOME HEALTH SERVICES | Facility: HOSPITAL | Age: 85
End: 2020-05-28
Payer: MEDICARE

## 2020-05-29 ENCOUNTER — PATIENT OUTREACH (OUTPATIENT)
Dept: ADMINISTRATIVE | Facility: OTHER | Age: 85
End: 2020-05-29

## 2020-06-04 ENCOUNTER — DOCUMENT SCAN (OUTPATIENT)
Dept: HOME HEALTH SERVICES | Facility: HOSPITAL | Age: 85
End: 2020-06-04
Payer: MEDICARE

## 2020-06-04 ENCOUNTER — PATIENT OUTREACH (OUTPATIENT)
Dept: ADMINISTRATIVE | Facility: OTHER | Age: 85
End: 2020-06-04

## 2020-06-04 PROCEDURE — 99499 NO LOS: ICD-10-PCS | Mod: ,,, | Performed by: FAMILY MEDICINE

## 2020-06-04 PROCEDURE — 99499 UNLISTED E&M SERVICE: CPT | Mod: ,,, | Performed by: FAMILY MEDICINE

## 2020-06-05 NOTE — PROGRESS NOTES
Chart reviewed.   Immunizations: Triggered Imm Registry     Orders placed: n/a  Upcoming appts to satisfy KRISTINA topics: n/a

## 2020-09-29 ENCOUNTER — PATIENT MESSAGE (OUTPATIENT)
Dept: OTHER | Facility: OTHER | Age: 85
End: 2020-09-29

## 2020-10-06 ENCOUNTER — PATIENT MESSAGE (OUTPATIENT)
Dept: ADMINISTRATIVE | Facility: HOSPITAL | Age: 85
End: 2020-10-06

## 2020-10-09 ENCOUNTER — PATIENT OUTREACH (OUTPATIENT)
Dept: ADMINISTRATIVE | Facility: HOSPITAL | Age: 85
End: 2020-10-09

## 2020-11-25 ENCOUNTER — PES CALL (OUTPATIENT)
Dept: ADMINISTRATIVE | Facility: CLINIC | Age: 85
End: 2020-11-25

## 2020-12-11 ENCOUNTER — PATIENT MESSAGE (OUTPATIENT)
Dept: OTHER | Facility: OTHER | Age: 85
End: 2020-12-11

## 2020-12-14 ENCOUNTER — PES CALL (OUTPATIENT)
Dept: ADMINISTRATIVE | Facility: CLINIC | Age: 85
End: 2020-12-14

## 2021-03-25 ENCOUNTER — PATIENT MESSAGE (OUTPATIENT)
Dept: ADMINISTRATIVE | Facility: HOSPITAL | Age: 86
End: 2021-03-25

## 2021-04-13 ENCOUNTER — TELEPHONE (OUTPATIENT)
Dept: PRIMARY CARE CLINIC | Facility: CLINIC | Age: 86
End: 2021-04-13

## 2021-11-22 ENCOUNTER — PATIENT OUTREACH (OUTPATIENT)
Dept: ADMINISTRATIVE | Facility: HOSPITAL | Age: 86
End: 2021-11-22
Payer: MEDICARE